# Patient Record
Sex: FEMALE | Race: WHITE | NOT HISPANIC OR LATINO | Employment: OTHER | URBAN - NONMETROPOLITAN AREA
[De-identification: names, ages, dates, MRNs, and addresses within clinical notes are randomized per-mention and may not be internally consistent; named-entity substitution may affect disease eponyms.]

---

## 2018-01-08 ENCOUNTER — OFFICE VISIT CONVERTED (OUTPATIENT)
Dept: FAMILY MEDICINE CLINIC | Age: 83
End: 2018-01-08
Attending: NURSE PRACTITIONER

## 2018-02-27 ENCOUNTER — CONVERSION ENCOUNTER (OUTPATIENT)
Dept: MAMMOGRAPHY | Facility: HOSPITAL | Age: 83
End: 2018-02-27

## 2018-05-22 ENCOUNTER — OFFICE VISIT CONVERTED (OUTPATIENT)
Dept: FAMILY MEDICINE CLINIC | Age: 83
End: 2018-05-22
Attending: NURSE PRACTITIONER

## 2018-10-18 ENCOUNTER — OFFICE VISIT CONVERTED (OUTPATIENT)
Dept: FAMILY MEDICINE CLINIC | Age: 83
End: 2018-10-18
Attending: NURSE PRACTITIONER

## 2018-11-05 ENCOUNTER — OFFICE VISIT CONVERTED (OUTPATIENT)
Dept: FAMILY MEDICINE CLINIC | Age: 83
End: 2018-11-05
Attending: NURSE PRACTITIONER

## 2018-12-19 ENCOUNTER — OFFICE VISIT CONVERTED (OUTPATIENT)
Dept: FAMILY MEDICINE CLINIC | Age: 83
End: 2018-12-19
Attending: NURSE PRACTITIONER

## 2019-01-03 ENCOUNTER — HOSPITAL ENCOUNTER (OUTPATIENT)
Dept: OTHER | Facility: HOSPITAL | Age: 84
Discharge: HOME | End: 2019-01-03
Attending: NURSE PRACTITIONER

## 2019-01-03 LAB
APPEARANCE UR: CLEAR
BACTERIA UR CULT: NORMAL
BACTERIA UR QL AUTO: ABNORMAL
BILIRUB UR QL: NEGATIVE
CASTS URNS QL MICRO: ABNORMAL /[LPF]
COLOR UR: ABNORMAL
CONV LEUKOCYTE ESTERASE: ABNORMAL
CONV UROBILINOGEN IN URINE BY AUTOMATED TEST STRIP: 0.2 {EHRLICHU}/DL (ref 0.1–1)
EPI CELLS #/AREA URNS HPF: ABNORMAL /[HPF]
GLUCOSE 24H UR-MCNC: NEGATIVE MG/DL
HGB UR QL STRIP: ABNORMAL
KETONES UR QL STRIP: NEGATIVE MG/DL
MUCOUS THREADS URNS QL MICRO: ABNORMAL
NITRITE UR-MCNC: NEGATIVE MG/ML
PH UR STRIP.AUTO: 6.5 [PH] (ref 5–8)
PROT UR-MCNC: NEGATIVE MG/DL
RBC # BLD AUTO: ABNORMAL /[HPF]
SP GR UR STRIP: 1.01 (ref 1–1.03)
SPECIMEN SOURCE: ABNORMAL
UNIDENT CRYS URNS QL MICRO: ABNORMAL /[HPF]
WBC #/AREA URNS HPF: ABNORMAL /[HPF]

## 2019-01-05 LAB
AMOXICILLIN+CLAV SUSC ISLT: 4
AMPICILLIN SUSC ISLT: >=32
AMPICILLIN+SULBAC SUSC ISLT: 16
BACTERIA UR CULT: ABNORMAL
CEFAZOLIN SUSC ISLT: <=4
CEFEPIME SUSC ISLT: <=1
CEFTAZIDIME SUSC ISLT: <=1
CEFTRIAXONE SUSC ISLT: <=1
CEFUROXIME ORAL SUSC ISLT: 4
CEFUROXIME PARENTER SUSC ISLT: 4
CIPROFLOXACIN SUSC ISLT: <=0.25
ERTAPENEM SUSC ISLT: <=0.5
GENTAMICIN SUSC ISLT: <=1
LEVOFLOXACIN SUSC ISLT: <=0.12
NITROFURANTOIN SUSC ISLT: <=16
TETRACYCLINE SUSC ISLT: <=1
TMP SMX SUSC ISLT: <=20
TOBRAMYCIN SUSC ISLT: <=1

## 2019-01-14 ENCOUNTER — OFFICE VISIT CONVERTED (OUTPATIENT)
Dept: FAMILY MEDICINE CLINIC | Age: 84
End: 2019-01-14
Attending: NURSE PRACTITIONER

## 2019-01-14 ENCOUNTER — HOSPITAL ENCOUNTER (OUTPATIENT)
Dept: OTHER | Facility: HOSPITAL | Age: 84
Discharge: HOME | End: 2019-01-14
Attending: NURSE PRACTITIONER

## 2019-01-14 LAB
ALBUMIN SERPL-MCNC: 4.1 G/DL (ref 3.5–5)
ALBUMIN/GLOB SERPL: 1.4 {RATIO} (ref 1.4–2.6)
ALP SERPL-CCNC: 70 U/L (ref 43–160)
ALT SERPL-CCNC: 12 U/L (ref 10–40)
ANION GAP SERPL CALC-SCNC: 20 MMOL/L (ref 8–19)
APPEARANCE UR: CLEAR
AST SERPL-CCNC: 20 U/L (ref 15–50)
BACTERIA UR QL AUTO: ABNORMAL
BASOPHILS # BLD MANUAL: 0.04 10*3/UL (ref 0–0.2)
BASOPHILS NFR BLD MANUAL: 0.5 % (ref 0–3)
BILIRUB SERPL-MCNC: 0.5 MG/DL (ref 0.2–1.3)
BILIRUB UR QL: ABNORMAL
BUN SERPL-MCNC: 20 MG/DL (ref 5–25)
BUN/CREAT SERPL: 20 {RATIO} (ref 6–20)
CALCIUM SERPL-MCNC: 9.1 MG/DL (ref 8.7–10.4)
CASTS URNS QL MICRO: ABNORMAL /[LPF]
CHLORIDE SERPL-SCNC: 102 MMOL/L (ref 99–111)
COLOR UR: YELLOW
CONV CO2: 22 MMOL/L (ref 22–32)
CONV LEUKOCYTE ESTERASE: NEGATIVE
CONV TOTAL PROTEIN: 7.1 G/DL (ref 6.3–8.2)
CONV UROBILINOGEN IN URINE BY AUTOMATED TEST STRIP: 1 {EHRLICHU}/DL (ref 0.1–1)
CREAT UR-MCNC: 1.01 MG/DL (ref 0.5–0.9)
DEPRECATED RDW RBC AUTO: 42.1 FL
EOSINOPHIL # BLD MANUAL: 0.71 10*3/UL (ref 0–0.7)
EOSINOPHIL NFR BLD MANUAL: 8.1 % (ref 0–7)
EPI CELLS #/AREA URNS HPF: ABNORMAL /[HPF]
ERYTHROCYTE [DISTWIDTH] IN BLOOD BY AUTOMATED COUNT: 12.7 % (ref 11.5–14.5)
GFR SERPLBLD BASED ON 1.73 SQ M-ARVRAT: 52 ML/MIN/{1.73_M2}
GLOBULIN UR ELPH-MCNC: 3 G/DL (ref 2–3.5)
GLUCOSE 24H UR-MCNC: NEGATIVE MG/DL
GLUCOSE SERPL-MCNC: 100 MG/DL (ref 65–99)
GRANS (ABSOLUTE): 6.11 10*3/UL (ref 2–8)
GRANS: 69.8 % (ref 30–85)
HBA1C MFR BLD: 14.4 G/DL (ref 12–16)
HCT VFR BLD AUTO: 42.5 % (ref 37–47)
HGB UR QL STRIP: ABNORMAL
IMM GRANULOCYTES # BLD: 0.02 10*3/UL (ref 0–0.54)
IMM GRANULOCYTES NFR BLD: 0.2 % (ref 0–0.43)
KETONES UR QL STRIP: 15 MG/DL
LYMPHOCYTES # BLD MANUAL: 1.03 10*3/UL (ref 1–5)
LYMPHOCYTES NFR BLD MANUAL: 9.6 % (ref 3–10)
MCH RBC QN AUTO: 30.5 PG (ref 27–31)
MCHC RBC AUTO-ENTMCNC: 33.9 G/DL (ref 33–37)
MCV RBC AUTO: 90 FL (ref 81–99)
MONOCYTES # BLD AUTO: 0.84 10*3/UL (ref 0.2–1.2)
MUCOUS THREADS URNS QL MICRO: ABNORMAL
NITRITE UR-MCNC: NEGATIVE MG/ML
OSMOLALITY SERPL CALC.SUM OF ELEC: 293 MOSM/KG (ref 273–304)
PH UR STRIP.AUTO: 5.5 [PH] (ref 5–8)
PLATELET # BLD AUTO: 169 10*3/UL (ref 130–400)
PMV BLD AUTO: 10.2 FL (ref 7.4–10.4)
POTASSIUM SERPL-SCNC: 3.6 MMOL/L (ref 3.5–5.3)
PROT UR-MCNC: ABNORMAL MG/DL
RBC # BLD AUTO: 4.72 10*6/UL (ref 4.2–5.4)
RBC # BLD AUTO: ABNORMAL /[HPF]
SODIUM SERPL-SCNC: 140 MMOL/L (ref 135–147)
SP GR UR STRIP: 1.02 (ref 1–1.03)
SPECIMEN SOURCE: ABNORMAL
UNIDENT CRYS URNS QL MICRO: ABNORMAL /[HPF]
VARIANT LYMPHS NFR BLD MANUAL: 11.8 % (ref 20–45)
WBC # BLD AUTO: 8.75 10*3/UL (ref 4.8–10.8)
WBC #/AREA URNS HPF: ABNORMAL /[HPF]

## 2019-01-17 LAB — BACTERIA UR CULT: NORMAL

## 2019-03-06 ENCOUNTER — HOSPITAL ENCOUNTER (OUTPATIENT)
Dept: OTHER | Facility: HOSPITAL | Age: 84
Discharge: HOME | End: 2019-03-06
Attending: NURSE PRACTITIONER

## 2019-03-06 LAB
CHOLEST SERPL-MCNC: 132 MG/DL (ref 107–200)
CHOLEST/HDLC SERPL: 2.7 {RATIO} (ref 3–6)
HDLC SERPL-MCNC: 49 MG/DL (ref 40–60)
LDLC SERPL CALC-MCNC: 69 MG/DL (ref 70–100)
TRIGL SERPL-MCNC: 72 MG/DL (ref 40–150)
VLDLC SERPL-MCNC: 14 MG/DL (ref 5–37)

## 2019-05-21 ENCOUNTER — HOSPITAL ENCOUNTER (OUTPATIENT)
Dept: OTHER | Facility: HOSPITAL | Age: 84
Discharge: HOME | End: 2019-05-21
Attending: NURSE PRACTITIONER

## 2019-05-21 ENCOUNTER — OFFICE VISIT CONVERTED (OUTPATIENT)
Dept: FAMILY MEDICINE CLINIC | Age: 84
End: 2019-05-21
Attending: NURSE PRACTITIONER

## 2019-05-21 LAB
ALBUMIN SERPL-MCNC: 4.3 G/DL (ref 3.5–5)
ALBUMIN/GLOB SERPL: 1.4 {RATIO} (ref 1.4–2.6)
ALP SERPL-CCNC: 59 U/L (ref 43–160)
ALT SERPL-CCNC: 14 U/L (ref 10–40)
ANION GAP SERPL CALC-SCNC: 14 MMOL/L (ref 8–19)
APPEARANCE UR: CLEAR
AST SERPL-CCNC: 26 U/L (ref 15–50)
BACTERIA UR CULT: NORMAL
BACTERIA UR QL AUTO: ABNORMAL
BILIRUB SERPL-MCNC: 0.53 MG/DL (ref 0.2–1.3)
BILIRUB UR QL: NEGATIVE
BUN SERPL-MCNC: 21 MG/DL (ref 5–25)
BUN/CREAT SERPL: 24 {RATIO} (ref 6–20)
CALCIUM SERPL-MCNC: 9.5 MG/DL (ref 8.7–10.4)
CASTS URNS QL MICRO: ABNORMAL /[LPF]
CHLORIDE SERPL-SCNC: 101 MMOL/L (ref 99–111)
COLOR UR: YELLOW
CONV CO2: 26 MMOL/L (ref 22–32)
CONV LEUKOCYTE ESTERASE: ABNORMAL
CONV TOTAL PROTEIN: 7.3 G/DL (ref 6.3–8.2)
CONV UROBILINOGEN IN URINE BY AUTOMATED TEST STRIP: 0.2 {EHRLICHU}/DL (ref 0.1–1)
CREAT UR-MCNC: 0.88 MG/DL (ref 0.5–0.9)
EPI CELLS #/AREA URNS HPF: ABNORMAL /[HPF]
GFR SERPLBLD BASED ON 1.73 SQ M-ARVRAT: >60 ML/MIN/{1.73_M2}
GLOBULIN UR ELPH-MCNC: 3 G/DL (ref 2–3.5)
GLUCOSE 24H UR-MCNC: NEGATIVE MG/DL
GLUCOSE SERPL-MCNC: 99 MG/DL (ref 65–99)
HGB UR QL STRIP: NEGATIVE
KETONES UR QL STRIP: NEGATIVE MG/DL
MUCOUS THREADS URNS QL MICRO: ABNORMAL
NITRITE UR-MCNC: NEGATIVE MG/ML
OSMOLALITY SERPL CALC.SUM OF ELEC: 287 MOSM/KG (ref 273–304)
PH UR STRIP.AUTO: 6 [PH] (ref 5–8)
POTASSIUM SERPL-SCNC: 4.2 MMOL/L (ref 3.5–5.3)
PROT UR-MCNC: NEGATIVE MG/DL
RBC # BLD AUTO: ABNORMAL /[HPF]
SODIUM SERPL-SCNC: 137 MMOL/L (ref 135–147)
SP GR UR STRIP: 1.01 (ref 1–1.03)
SPECIMEN SOURCE: ABNORMAL
UNIDENT CRYS URNS QL MICRO: ABNORMAL /[HPF]
WBC #/AREA URNS HPF: ABNORMAL /[HPF]

## 2019-05-24 LAB
AMOXICILLIN+CLAV SUSC ISLT: 8
AMPICILLIN SUSC ISLT: >=32
AMPICILLIN+SULBAC SUSC ISLT: >=32
BACTERIA UR CULT: ABNORMAL
CEFAZOLIN SUSC ISLT: <=4
CEFEPIME SUSC ISLT: <=1
CEFTAZIDIME SUSC ISLT: <=1
CEFTRIAXONE SUSC ISLT: <=1
CEFUROXIME ORAL SUSC ISLT: 4
CEFUROXIME PARENTER SUSC ISLT: 4
CIPROFLOXACIN SUSC ISLT: <=0.25
ERTAPENEM SUSC ISLT: <=0.5
GENTAMICIN SUSC ISLT: <=1
LEVOFLOXACIN SUSC ISLT: <=0.12
NITROFURANTOIN SUSC ISLT: <=16
TETRACYCLINE SUSC ISLT: <=1
TMP SMX SUSC ISLT: <=20
TOBRAMYCIN SUSC ISLT: <=1

## 2019-05-28 ENCOUNTER — HOSPITAL ENCOUNTER (OUTPATIENT)
Dept: OTHER | Facility: HOSPITAL | Age: 84
Discharge: HOME | End: 2019-05-28
Attending: NURSE PRACTITIONER

## 2019-05-28 ENCOUNTER — OFFICE VISIT CONVERTED (OUTPATIENT)
Dept: FAMILY MEDICINE CLINIC | Age: 84
End: 2019-05-28
Attending: NURSE PRACTITIONER

## 2019-05-28 LAB
ANION GAP SERPL CALC-SCNC: 20 MMOL/L (ref 8–19)
APPEARANCE UR: CLEAR
BACTERIA UR QL AUTO: ABNORMAL
BASOPHILS # BLD AUTO: 0.03 10*3/UL (ref 0–0.2)
BASOPHILS NFR BLD AUTO: 0.4 % (ref 0–3)
BILIRUB UR QL: NEGATIVE
BUN SERPL-MCNC: 30 MG/DL (ref 5–25)
BUN/CREAT SERPL: 29 {RATIO} (ref 6–20)
CALCIUM SERPL-MCNC: 9.2 MG/DL (ref 8.7–10.4)
CASTS URNS QL MICRO: ABNORMAL /[LPF]
CHLORIDE SERPL-SCNC: 101 MMOL/L (ref 99–111)
COLOR UR: YELLOW
CONV ABS IMM GRAN: 0.01 10*3/UL (ref 0–0.2)
CONV CO2: 23 MMOL/L (ref 22–32)
CONV IMMATURE GRAN: 0.1 % (ref 0–1.8)
CONV LEUKOCYTE ESTERASE: ABNORMAL
CONV UROBILINOGEN IN URINE BY AUTOMATED TEST STRIP: 1 {EHRLICHU}/DL (ref 0.1–1)
CREAT UR-MCNC: 1.03 MG/DL (ref 0.5–0.9)
DEPRECATED RDW RBC AUTO: 42.3 FL (ref 36.4–46.3)
EOSINOPHIL # BLD AUTO: 0.59 10*3/UL (ref 0–0.7)
EOSINOPHIL # BLD AUTO: 8.7 % (ref 0–7)
EPI CELLS #/AREA URNS HPF: ABNORMAL /[HPF]
ERYTHROCYTE [DISTWIDTH] IN BLOOD BY AUTOMATED COUNT: 12.8 % (ref 11.7–14.4)
GFR SERPLBLD BASED ON 1.73 SQ M-ARVRAT: 51 ML/MIN/{1.73_M2}
GLUCOSE 24H UR-MCNC: NEGATIVE MG/DL
GLUCOSE SERPL-MCNC: 103 MG/DL (ref 65–99)
HBA1C MFR BLD: 14.1 G/DL (ref 12–16)
HCT VFR BLD AUTO: 42.3 % (ref 37–47)
HGB UR QL STRIP: ABNORMAL
KETONES UR QL STRIP: ABNORMAL MG/DL
LYMPHOCYTES # BLD AUTO: 0.95 10*3/UL (ref 1–5)
MCH RBC QN AUTO: 30.5 PG (ref 27–31)
MCHC RBC AUTO-ENTMCNC: 33.3 G/DL (ref 33–37)
MCV RBC AUTO: 91.6 FL (ref 81–99)
MONOCYTES # BLD AUTO: 0.65 10*3/UL (ref 0.2–1.2)
MONOCYTES NFR BLD AUTO: 9.5 % (ref 3–10)
MUCOUS THREADS URNS QL MICRO: ABNORMAL
NEUTROPHILS # BLD AUTO: 4.59 10*3/UL (ref 2–8)
NEUTROPHILS NFR BLD AUTO: 67.4 % (ref 30–85)
NITRITE UR-MCNC: NEGATIVE MG/ML
NRBC CBCN: 0 % (ref 0–0.7)
OSMOLALITY SERPL CALC.SUM OF ELEC: 296 MOSM/KG (ref 273–304)
PH UR STRIP.AUTO: 5.5 [PH] (ref 5–8)
PLATELET # BLD AUTO: 165 10*3/UL (ref 130–400)
PMV BLD AUTO: 10.5 FL (ref 9.4–12.3)
POTASSIUM SERPL-SCNC: 3.5 MMOL/L (ref 3.5–5.3)
PROT UR-MCNC: NEGATIVE MG/DL
RBC # BLD AUTO: 4.62 10*6/UL (ref 4.2–5.4)
RBC # BLD AUTO: ABNORMAL /[HPF]
SODIUM SERPL-SCNC: 140 MMOL/L (ref 135–147)
SP GR UR STRIP: 1.01 (ref 1–1.03)
SPECIMEN SOURCE: ABNORMAL
UNIDENT CRYS URNS QL MICRO: ABNORMAL /[HPF]
VARIANT LYMPHS NFR BLD MANUAL: 13.9 % (ref 20–45)
WBC # BLD AUTO: 6.82 10*3/UL (ref 4.8–10.8)
WBC #/AREA URNS HPF: ABNORMAL /[HPF]

## 2019-06-01 LAB — BACTERIA UR CULT: NORMAL

## 2019-06-12 ENCOUNTER — HOSPITAL ENCOUNTER (OUTPATIENT)
Dept: OTHER | Facility: HOSPITAL | Age: 84
Discharge: HOME | End: 2019-06-12
Attending: NURSE PRACTITIONER

## 2019-06-12 LAB
ANION GAP SERPL CALC-SCNC: 17 MMOL/L (ref 8–19)
BUN SERPL-MCNC: 21 MG/DL (ref 5–25)
BUN/CREAT SERPL: 21 {RATIO} (ref 6–20)
CALCIUM SERPL-MCNC: 9.2 MG/DL (ref 8.7–10.4)
CHLORIDE SERPL-SCNC: 103 MMOL/L (ref 99–111)
CONV CO2: 25 MMOL/L (ref 22–32)
CREAT UR-MCNC: 1 MG/DL (ref 0.5–0.9)
GFR SERPLBLD BASED ON 1.73 SQ M-ARVRAT: 53 ML/MIN/{1.73_M2}
GLUCOSE SERPL-MCNC: 123 MG/DL (ref 65–99)
OSMOLALITY SERPL CALC.SUM OF ELEC: 294 MOSM/KG (ref 273–304)
POTASSIUM SERPL-SCNC: 4.6 MMOL/L (ref 3.5–5.3)
SODIUM SERPL-SCNC: 140 MMOL/L (ref 135–147)

## 2019-07-17 ENCOUNTER — HOSPITAL ENCOUNTER (OUTPATIENT)
Dept: OTHER | Facility: HOSPITAL | Age: 84
Discharge: HOME | End: 2019-07-17
Attending: NURSE PRACTITIONER

## 2019-07-17 LAB
ANION GAP SERPL CALC-SCNC: 16 MMOL/L (ref 8–19)
BUN SERPL-MCNC: 21 MG/DL (ref 5–25)
BUN/CREAT SERPL: 22 {RATIO} (ref 6–20)
CALCIUM SERPL-MCNC: 9.2 MG/DL (ref 8.7–10.4)
CHLORIDE SERPL-SCNC: 101 MMOL/L (ref 99–111)
CONV CO2: 26 MMOL/L (ref 22–32)
CREAT UR-MCNC: 0.97 MG/DL (ref 0.5–0.9)
GFR SERPLBLD BASED ON 1.73 SQ M-ARVRAT: 54 ML/MIN/{1.73_M2}
GLUCOSE SERPL-MCNC: 107 MG/DL (ref 65–99)
OSMOLALITY SERPL CALC.SUM OF ELEC: 291 MOSM/KG (ref 273–304)
POTASSIUM SERPL-SCNC: 4.4 MMOL/L (ref 3.5–5.3)
SODIUM SERPL-SCNC: 139 MMOL/L (ref 135–147)

## 2019-11-25 ENCOUNTER — OFFICE VISIT CONVERTED (OUTPATIENT)
Dept: FAMILY MEDICINE CLINIC | Age: 84
End: 2019-11-25
Attending: NURSE PRACTITIONER

## 2019-11-25 ENCOUNTER — HOSPITAL ENCOUNTER (OUTPATIENT)
Dept: OTHER | Facility: HOSPITAL | Age: 84
Discharge: HOME | End: 2019-11-25
Attending: NURSE PRACTITIONER

## 2019-11-25 LAB
ALBUMIN SERPL-MCNC: 4.4 G/DL (ref 3.5–5)
ALBUMIN/GLOB SERPL: 1.7 {RATIO} (ref 1.4–2.6)
ALP SERPL-CCNC: 54 U/L (ref 43–160)
ALT SERPL-CCNC: 18 U/L (ref 10–40)
ANION GAP SERPL CALC-SCNC: 19 MMOL/L (ref 8–19)
AST SERPL-CCNC: 30 U/L (ref 15–50)
BILIRUB SERPL-MCNC: 0.25 MG/DL (ref 0.2–1.3)
BUN SERPL-MCNC: 29 MG/DL (ref 5–25)
BUN/CREAT SERPL: 35 {RATIO} (ref 6–20)
CALCIUM SERPL-MCNC: 9.6 MG/DL (ref 8.7–10.4)
CHLORIDE SERPL-SCNC: 102 MMOL/L (ref 99–111)
CONV CO2: 24 MMOL/L (ref 22–32)
CONV TOTAL PROTEIN: 7 G/DL (ref 6.3–8.2)
CREAT UR-MCNC: 0.84 MG/DL (ref 0.5–0.9)
GFR SERPLBLD BASED ON 1.73 SQ M-ARVRAT: >60 ML/MIN/{1.73_M2}
GLOBULIN UR ELPH-MCNC: 2.6 G/DL (ref 2–3.5)
GLUCOSE SERPL-MCNC: 106 MG/DL (ref 65–99)
OSMOLALITY SERPL CALC.SUM OF ELEC: 298 MOSM/KG (ref 273–304)
POTASSIUM SERPL-SCNC: 4.4 MMOL/L (ref 3.5–5.3)
SODIUM SERPL-SCNC: 141 MMOL/L (ref 135–147)
TSH SERPL-ACNC: 1.16 M[IU]/L (ref 0.27–4.2)

## 2019-11-26 LAB
BASOPHILS # BLD MANUAL: 0.05 10*3/UL (ref 0–0.2)
BASOPHILS NFR BLD MANUAL: 0.7 % (ref 0–3)
DEPRECATED RDW RBC AUTO: 40.9 FL
EOSINOPHIL # BLD MANUAL: 0.18 10*3/UL (ref 0–0.7)
EOSINOPHIL NFR BLD MANUAL: 2.4 % (ref 0–7)
ERYTHROCYTE [DISTWIDTH] IN BLOOD BY AUTOMATED COUNT: 12.3 % (ref 11.5–14.5)
GRANS (ABSOLUTE): 5.02 10*3/UL (ref 2–8)
GRANS: 68.3 % (ref 30–85)
HBA1C MFR BLD: 14.4 G/DL (ref 12–16)
HCT VFR BLD AUTO: 42.8 % (ref 37–47)
IMM GRANULOCYTES # BLD: 0.01 10*3/UL (ref 0–0.54)
IMM GRANULOCYTES NFR BLD: 0.1 % (ref 0–0.43)
LYMPHOCYTES # BLD MANUAL: 1.51 10*3/UL (ref 1–5)
LYMPHOCYTES NFR BLD MANUAL: 8 % (ref 3–10)
MCH RBC QN AUTO: 30.3 PG (ref 27–31)
MCHC RBC AUTO-ENTMCNC: 33.6 G/DL (ref 33–37)
MCV RBC AUTO: 90.1 FL (ref 81–99)
MONOCYTES # BLD AUTO: 0.59 10*3/UL (ref 0.2–1.2)
PLATELET # BLD AUTO: 198 10*3/UL (ref 130–400)
PMV BLD AUTO: 10.7 FL (ref 7.4–10.4)
RBC # BLD AUTO: 4.75 10*6/UL (ref 4.2–5.4)
VARIANT LYMPHS NFR BLD MANUAL: 20.5 % (ref 20–45)
WBC # BLD AUTO: 7.36 10*3/UL (ref 4.8–10.8)

## 2019-12-19 ENCOUNTER — HOSPITAL ENCOUNTER (OUTPATIENT)
Dept: OTHER | Facility: HOSPITAL | Age: 84
Discharge: HOME | End: 2019-12-19
Attending: NURSE PRACTITIONER

## 2020-02-12 ENCOUNTER — OFFICE VISIT CONVERTED (OUTPATIENT)
Dept: FAMILY MEDICINE CLINIC | Age: 85
End: 2020-02-12
Attending: NURSE PRACTITIONER

## 2020-02-13 ENCOUNTER — HOSPITAL ENCOUNTER (OUTPATIENT)
Dept: OTHER | Facility: HOSPITAL | Age: 85
Discharge: HOME | End: 2020-02-13
Attending: NURSE PRACTITIONER

## 2020-02-13 LAB
25(OH)D3 SERPL-MCNC: 59.6 NG/ML (ref 30–100)
ALBUMIN SERPL-MCNC: 4.2 G/DL (ref 3.5–5)
ALBUMIN/GLOB SERPL: 1.7 {RATIO} (ref 1.4–2.6)
ALP SERPL-CCNC: 52 U/L (ref 43–160)
ALT SERPL-CCNC: 17 U/L (ref 10–40)
ANION GAP SERPL CALC-SCNC: 16 MMOL/L (ref 8–19)
AST SERPL-CCNC: 26 U/L (ref 15–50)
BILIRUB SERPL-MCNC: 0.47 MG/DL (ref 0.2–1.3)
BUN SERPL-MCNC: 21 MG/DL (ref 5–25)
BUN/CREAT SERPL: 24 {RATIO} (ref 6–20)
CALCIUM SERPL-MCNC: 9.4 MG/DL (ref 8.7–10.4)
CHLORIDE SERPL-SCNC: 102 MMOL/L (ref 99–111)
CHOLEST SERPL-MCNC: 127 MG/DL (ref 107–200)
CHOLEST/HDLC SERPL: 2.7 {RATIO} (ref 3–6)
CONV CO2: 25 MMOL/L (ref 22–32)
CONV TOTAL PROTEIN: 6.7 G/DL (ref 6.3–8.2)
CREAT UR-MCNC: 0.88 MG/DL (ref 0.5–0.9)
GFR SERPLBLD BASED ON 1.73 SQ M-ARVRAT: >60 ML/MIN/{1.73_M2}
GLOBULIN UR ELPH-MCNC: 2.5 G/DL (ref 2–3.5)
GLUCOSE SERPL-MCNC: 103 MG/DL (ref 65–99)
HDLC SERPL-MCNC: 47 MG/DL (ref 40–60)
LDLC SERPL CALC-MCNC: 63 MG/DL (ref 70–100)
OSMOLALITY SERPL CALC.SUM OF ELEC: 291 MOSM/KG (ref 273–304)
POTASSIUM SERPL-SCNC: 4.2 MMOL/L (ref 3.5–5.3)
SODIUM SERPL-SCNC: 139 MMOL/L (ref 135–147)
TRIGL SERPL-MCNC: 84 MG/DL (ref 40–150)
VLDLC SERPL-MCNC: 17 MG/DL (ref 5–37)

## 2020-03-04 ENCOUNTER — HOSPITAL ENCOUNTER (OUTPATIENT)
Dept: CARDIOLOGY | Facility: HOSPITAL | Age: 85
Discharge: HOME | End: 2020-03-04

## 2020-08-12 ENCOUNTER — OFFICE VISIT CONVERTED (OUTPATIENT)
Dept: FAMILY MEDICINE CLINIC | Age: 85
End: 2020-08-12
Attending: NURSE PRACTITIONER

## 2020-08-13 ENCOUNTER — HOSPITAL ENCOUNTER (OUTPATIENT)
Dept: OTHER | Facility: HOSPITAL | Age: 85
Discharge: HOME | End: 2020-08-13
Attending: NURSE PRACTITIONER

## 2020-08-13 LAB
ALBUMIN SERPL-MCNC: 4.4 G/DL (ref 3.5–5)
ALBUMIN/GLOB SERPL: 1.8 {RATIO} (ref 1.4–2.6)
ALP SERPL-CCNC: 59 U/L (ref 43–160)
ALT SERPL-CCNC: 13 U/L (ref 10–40)
ANION GAP SERPL CALC-SCNC: 23 MMOL/L (ref 8–19)
AST SERPL-CCNC: 25 U/L (ref 15–50)
BASOPHILS # BLD MANUAL: 0.05 10*3/UL (ref 0–0.2)
BASOPHILS NFR BLD MANUAL: 0.7 % (ref 0–3)
BILIRUB SERPL-MCNC: 0.59 MG/DL (ref 0.2–1.3)
BUN SERPL-MCNC: 20 MG/DL (ref 5–25)
BUN/CREAT SERPL: 19 {RATIO} (ref 6–20)
CALCIUM SERPL-MCNC: 10.2 MG/DL (ref 8.7–10.4)
CHLORIDE SERPL-SCNC: 105 MMOL/L (ref 99–111)
CHOLEST SERPL-MCNC: 144 MG/DL (ref 107–200)
CHOLEST/HDLC SERPL: 2.9 {RATIO} (ref 3–6)
CONV CO2: 21 MMOL/L (ref 22–32)
CONV TOTAL PROTEIN: 6.8 G/DL (ref 6.3–8.2)
CREAT UR-MCNC: 1.06 MG/DL (ref 0.5–0.9)
DEPRECATED RDW RBC AUTO: 41.8 FL
EOSINOPHIL # BLD MANUAL: 0.16 10*3/UL (ref 0–0.7)
EOSINOPHIL NFR BLD MANUAL: 2.4 % (ref 0–7)
ERYTHROCYTE [DISTWIDTH] IN BLOOD BY AUTOMATED COUNT: 12.6 % (ref 11.5–14.5)
GFR SERPLBLD BASED ON 1.73 SQ M-ARVRAT: 49 ML/MIN/{1.73_M2}
GLOBULIN UR ELPH-MCNC: 2.4 G/DL (ref 2–3.5)
GLUCOSE SERPL-MCNC: 111 MG/DL (ref 65–99)
GRANS (ABSOLUTE): 4.09 10*3/UL (ref 2–8)
GRANS: 60.5 % (ref 30–85)
HBA1C MFR BLD: 14.2 G/DL (ref 12–16)
HCT VFR BLD AUTO: 42.2 % (ref 37–47)
HDLC SERPL-MCNC: 49 MG/DL (ref 40–60)
IMM GRANULOCYTES # BLD: 0.01 10*3/UL (ref 0–0.54)
IMM GRANULOCYTES NFR BLD: 0.1 % (ref 0–0.43)
LDLC SERPL CALC-MCNC: 79 MG/DL (ref 70–100)
LYMPHOCYTES # BLD MANUAL: 1.81 10*3/UL (ref 1–5)
LYMPHOCYTES NFR BLD MANUAL: 9.6 % (ref 3–10)
MCH RBC QN AUTO: 30 PG (ref 27–31)
MCHC RBC AUTO-ENTMCNC: 33.6 G/DL (ref 33–37)
MCV RBC AUTO: 89 FL (ref 81–99)
MONOCYTES # BLD AUTO: 0.65 10*3/UL (ref 0.2–1.2)
OSMOLALITY SERPL CALC.SUM OF ELEC: 303 MOSM/KG (ref 273–304)
PLATELET # BLD AUTO: 168 10*3/UL (ref 130–400)
PMV BLD AUTO: 9.6 FL (ref 7.4–10.4)
POTASSIUM SERPL-SCNC: 4.4 MMOL/L (ref 3.5–5.3)
RBC # BLD AUTO: 4.74 10*6/UL (ref 4.2–5.4)
SODIUM SERPL-SCNC: 145 MMOL/L (ref 135–147)
TRIGL SERPL-MCNC: 80 MG/DL (ref 40–150)
VARIANT LYMPHS NFR BLD MANUAL: 26.7 % (ref 20–45)
VLDLC SERPL-MCNC: 16 MG/DL (ref 5–37)
WBC # BLD AUTO: 6.77 10*3/UL (ref 4.8–10.8)

## 2020-08-20 ENCOUNTER — OFFICE VISIT CONVERTED (OUTPATIENT)
Dept: FAMILY MEDICINE CLINIC | Age: 85
End: 2020-08-20
Attending: NURSE PRACTITIONER

## 2020-08-24 ENCOUNTER — HOSPITAL ENCOUNTER (OUTPATIENT)
Dept: OTHER | Facility: HOSPITAL | Age: 85
Discharge: HOME | End: 2020-08-24
Attending: NURSE PRACTITIONER

## 2020-08-26 ENCOUNTER — HOSPITAL ENCOUNTER (OUTPATIENT)
Dept: PET IMAGING | Facility: HOSPITAL | Age: 85
Discharge: HOME | End: 2020-08-26
Attending: NURSE PRACTITIONER

## 2020-09-03 ENCOUNTER — HOSPITAL ENCOUNTER (OUTPATIENT)
Dept: CARDIOLOGY | Facility: HOSPITAL | Age: 85
Discharge: HOME | End: 2020-09-03
Attending: INTERNAL MEDICINE

## 2020-09-15 ENCOUNTER — HOSPITAL ENCOUNTER (OUTPATIENT)
Dept: OTHER | Facility: HOSPITAL | Age: 85
Discharge: HOME | End: 2020-09-15
Attending: THORACIC SURGERY (CARDIOTHORACIC VASCULAR SURGERY)

## 2020-09-22 ENCOUNTER — OFFICE VISIT CONVERTED (OUTPATIENT)
Dept: ONCOLOGY | Facility: HOSPITAL | Age: 85
End: 2020-09-22
Attending: INTERNAL MEDICINE

## 2020-10-19 ENCOUNTER — HOSPITAL ENCOUNTER (OUTPATIENT)
Dept: GENERAL RADIOLOGY | Facility: HOSPITAL | Age: 85
Discharge: HOME | End: 2020-10-19
Attending: THORACIC SURGERY (CARDIOTHORACIC VASCULAR SURGERY)

## 2020-10-20 ENCOUNTER — HOSPITAL ENCOUNTER (OUTPATIENT)
Dept: OTHER | Facility: HOSPITAL | Age: 85
Discharge: HOME | End: 2020-10-20
Attending: THORACIC SURGERY (CARDIOTHORACIC VASCULAR SURGERY)

## 2020-10-20 ENCOUNTER — OFFICE VISIT CONVERTED (OUTPATIENT)
Dept: ONCOLOGY | Facility: HOSPITAL | Age: 85
End: 2020-10-20
Attending: THORACIC SURGERY (CARDIOTHORACIC VASCULAR SURGERY)

## 2020-10-20 ENCOUNTER — HOSPITAL ENCOUNTER (OUTPATIENT)
Dept: GENERAL RADIOLOGY | Facility: HOSPITAL | Age: 85
Discharge: HOME | End: 2020-10-20
Attending: THORACIC SURGERY (CARDIOTHORACIC VASCULAR SURGERY)

## 2020-11-03 ENCOUNTER — OFFICE VISIT CONVERTED (OUTPATIENT)
Dept: ONCOLOGY | Facility: HOSPITAL | Age: 85
End: 2020-11-03
Attending: THORACIC SURGERY (CARDIOTHORACIC VASCULAR SURGERY)

## 2020-11-03 ENCOUNTER — HOSPITAL ENCOUNTER (OUTPATIENT)
Dept: ONCOLOGY | Facility: HOSPITAL | Age: 85
Discharge: HOME | End: 2020-11-03
Attending: THORACIC SURGERY (CARDIOTHORACIC VASCULAR SURGERY)

## 2020-11-16 ENCOUNTER — HOSPITAL ENCOUNTER (OUTPATIENT)
Dept: OTHER | Facility: HOSPITAL | Age: 85
Discharge: HOME | End: 2020-11-16
Attending: NURSE PRACTITIONER

## 2020-11-16 ENCOUNTER — OFFICE VISIT CONVERTED (OUTPATIENT)
Dept: FAMILY MEDICINE CLINIC | Age: 85
End: 2020-11-16
Attending: NURSE PRACTITIONER

## 2020-11-16 LAB
ALBUMIN SERPL-MCNC: 4.2 G/DL (ref 3.5–5)
ALBUMIN/GLOB SERPL: 1.7 {RATIO} (ref 1.4–2.6)
ALP SERPL-CCNC: 68 U/L (ref 43–160)
ALT SERPL-CCNC: 15 U/L (ref 10–40)
ANION GAP SERPL CALC-SCNC: 18 MMOL/L (ref 8–19)
AST SERPL-CCNC: 23 U/L (ref 15–50)
BILIRUB SERPL-MCNC: 0.26 MG/DL (ref 0.2–1.3)
BUN SERPL-MCNC: 19 MG/DL (ref 5–25)
BUN/CREAT SERPL: 25 {RATIO} (ref 6–20)
CALCIUM SERPL-MCNC: 9.3 MG/DL (ref 8.7–10.4)
CHLORIDE SERPL-SCNC: 106 MMOL/L (ref 99–111)
CONV CO2: 24 MMOL/L (ref 22–32)
CONV TOTAL PROTEIN: 6.7 G/DL (ref 6.3–8.2)
CREAT UR-MCNC: 0.76 MG/DL (ref 0.5–0.9)
GFR SERPLBLD BASED ON 1.73 SQ M-ARVRAT: >60 ML/MIN/{1.73_M2}
GLOBULIN UR ELPH-MCNC: 2.5 G/DL (ref 2–3.5)
GLUCOSE SERPL-MCNC: 99 MG/DL (ref 65–99)
OSMOLALITY SERPL CALC.SUM OF ELEC: 300 MOSM/KG (ref 273–304)
POTASSIUM SERPL-SCNC: 4.1 MMOL/L (ref 3.5–5.3)
SODIUM SERPL-SCNC: 144 MMOL/L (ref 135–147)

## 2021-01-06 ENCOUNTER — HOSPITAL ENCOUNTER (OUTPATIENT)
Dept: OTHER | Facility: HOSPITAL | Age: 86
Discharge: HOME | End: 2021-01-06
Attending: NURSE PRACTITIONER

## 2021-01-06 ENCOUNTER — OFFICE VISIT CONVERTED (OUTPATIENT)
Dept: FAMILY MEDICINE CLINIC | Age: 86
End: 2021-01-06
Attending: NURSE PRACTITIONER

## 2021-01-06 LAB
ALBUMIN SERPL-MCNC: 4.2 G/DL (ref 3.5–5)
ALBUMIN/GLOB SERPL: 1.7 {RATIO} (ref 1.4–2.6)
ALP SERPL-CCNC: 62 U/L (ref 43–160)
ALT SERPL-CCNC: 13 U/L (ref 10–40)
ANION GAP SERPL CALC-SCNC: 13 MMOL/L (ref 8–19)
AST SERPL-CCNC: 24 U/L (ref 15–50)
BASOPHILS # BLD MANUAL: 0.03 10*3/UL (ref 0–0.2)
BASOPHILS NFR BLD MANUAL: 0.5 % (ref 0–3)
BILIRUB SERPL-MCNC: 0.41 MG/DL (ref 0.2–1.3)
BUN SERPL-MCNC: 18 MG/DL (ref 5–25)
BUN/CREAT SERPL: 21 {RATIO} (ref 6–20)
CALCIUM SERPL-MCNC: 9.3 MG/DL (ref 8.7–10.4)
CHLORIDE SERPL-SCNC: 104 MMOL/L (ref 99–111)
CHOLEST SERPL-MCNC: 153 MG/DL (ref 107–200)
CHOLEST/HDLC SERPL: 3.3 {RATIO} (ref 3–6)
CONV CO2: 28 MMOL/L (ref 22–32)
CONV TOTAL PROTEIN: 6.7 G/DL (ref 6.3–8.2)
CREAT UR-MCNC: 0.86 MG/DL (ref 0.5–0.9)
DEPRECATED RDW RBC AUTO: 44.1 FL
EOSINOPHIL # BLD MANUAL: 0.13 10*3/UL (ref 0–0.7)
EOSINOPHIL NFR BLD MANUAL: 2.2 % (ref 0–7)
ERYTHROCYTE [DISTWIDTH] IN BLOOD BY AUTOMATED COUNT: 13 % (ref 11.5–14.5)
GFR SERPLBLD BASED ON 1.73 SQ M-ARVRAT: >60 ML/MIN/{1.73_M2}
GLOBULIN UR ELPH-MCNC: 2.5 G/DL (ref 2–3.5)
GLUCOSE SERPL-MCNC: 87 MG/DL (ref 65–99)
GRANS (ABSOLUTE): 3.45 10*3/UL (ref 2–8)
GRANS: 57.8 % (ref 30–85)
HBA1C MFR BLD: 13.3 G/DL (ref 12–16)
HCT VFR BLD AUTO: 40 % (ref 37–47)
HDLC SERPL-MCNC: 47 MG/DL (ref 40–60)
IMM GRANULOCYTES # BLD: 0 10*3/UL (ref 0–0.54)
IMM GRANULOCYTES NFR BLD: 0 % (ref 0–0.43)
LDLC SERPL CALC-MCNC: 90 MG/DL (ref 70–100)
LYMPHOCYTES # BLD MANUAL: 1.79 10*3/UL (ref 1–5)
LYMPHOCYTES NFR BLD MANUAL: 9.5 % (ref 3–10)
MCH RBC QN AUTO: 30.4 PG (ref 27–31)
MCHC RBC AUTO-ENTMCNC: 33.3 G/DL (ref 33–37)
MCV RBC AUTO: 91.3 FL (ref 81–99)
MONOCYTES # BLD AUTO: 0.57 10*3/UL (ref 0.2–1.2)
OSMOLALITY SERPL CALC.SUM OF ELEC: 293 MOSM/KG (ref 273–304)
PLATELET # BLD AUTO: 166 10*3/UL (ref 130–400)
PMV BLD AUTO: 8.9 FL (ref 7.4–10.4)
POTASSIUM SERPL-SCNC: 3.8 MMOL/L (ref 3.5–5.3)
RBC # BLD AUTO: 4.38 10*6/UL (ref 4.2–5.4)
SODIUM SERPL-SCNC: 141 MMOL/L (ref 135–147)
TRIGL SERPL-MCNC: 81 MG/DL (ref 40–150)
TSH SERPL-ACNC: 1.28 M[IU]/L (ref 0.27–4.2)
VARIANT LYMPHS NFR BLD MANUAL: 30 % (ref 20–45)
VLDLC SERPL-MCNC: 16 MG/DL (ref 5–37)
WBC # BLD AUTO: 5.97 10*3/UL (ref 4.8–10.8)

## 2021-01-07 LAB
FOLATE SERPL-MCNC: >20 NG/ML (ref 4.8–20)
VIT B12 SERPL-MCNC: 630 PG/ML (ref 211–911)

## 2021-03-10 ENCOUNTER — HOSPITAL ENCOUNTER (OUTPATIENT)
Dept: VACCINE CLINIC | Facility: HOSPITAL | Age: 86
Discharge: HOME | End: 2021-03-10
Attending: INTERNAL MEDICINE

## 2021-05-18 NOTE — PROGRESS NOTES
Karly Laguerre 1935     Office/Outpatient Visit    Visit Date: Tue, May 22, 2018 10:14 am    Provider: Celsa Boswell N.P. (Assistant: Yadira Trujillo MA)    Location: [facility]        Electronically signed by Celsa Boswell N.P. on  2018 10:40:22 AM                             SUBJECTIVE:        CC:     Ms. Laguerre is a 82 year old White female.  This is a follow-up visit.  check up;         HPI:         Patient to be evaluated for essential hypertension.  Her current cardiac medication regimen includes a beta-blocker ( Tenormin ).  She is tolerating the medication well without side effects.  Compliance with treatment has been good; she takes her medication as directed.      ROS:     CONSTITUTIONAL:  Negative for chills, fatigue, fever, and weight change.      CARDIOVASCULAR:  Negative for chest pain, palpitations, tachycardia, orthopnea, and edema.      RESPIRATORY:  Negative for cough, dyspnea, and hemoptysis.      NEUROLOGICAL:  Positive for light headed at times.  had her sugar checked and she is  not sure what it was but between 50-70, has been trying to eat more         PMH/FMH/SH:     Last Reviewed on 2018 10:26 AM by Celsa Boswell    Past Medical History:             GYNECOLOGICAL HISTORY:             PREVENTIVE HEALTH MAINTENANCE             BONE DENSITY: was last done 14 with the following abnormality noted-- osteopenia     COLORECTAL CANCER SCREENING: colonoscopy with the following abnormalities noted-- severe diverticulosis; dr oneill     MAMMOGRAM: Done within last 2 years and results in are chart was last done 2018 stable         Surgical History:         Biopsy of breast; benign    Cholecystectomy    Dilation and Curettage    Hysterectomy: rectocele repair / BSO;     COLONOSCOPY: Done within the last 10 years was last done 3-15-17         Family History:     Father:  at age 32; Cause of death was MVA     Mother:  at age 61; Cause of death  was CLL     Brother(s): Coronary Artery Disease; Hypertension     Sister(s): Hypertension;  Colon Cancer;  COPD         Social History:     Occupation: Retired (Prior occupation: RN)     Marital Status:      Children: 2 children         Tobacco/Alcohol/Supplements:     Last Reviewed on 5/22/2018 10:18 AM by Yadira Trujillo    Tobacco: She has a past history of cigarette smoking; quit date:  1982.          Alcohol: Frequency: Daily (glass of wine)             Immunizations:     zzPrevnar-13 1/17/2017     Fluzone (3 + years dose) 9/21/2009     Pneomovax 11/0/2003     Fluzone High-Dose pf (>=65 yr) 10/16/2013     Fluzone High-Dose pf (>=65 yr) 10/7/2015     Fluzone High-Dose pf (>=65 yr) 9/30/2016     Zostavax (Zoster) 2/24/2010         Allergies:     Last Reviewed on 5/22/2018 10:18 AM by Yadira Trujillo    Penicillins:    ACE Inhibitors:    Bactrim:        Current Medications:     Last Reviewed on 5/22/2018 10:18 AM by Yadira Trujillo    Atenolol 25mg Tablet Take 1 tablet(s) by mouth BID     Multivitamin/Mineral Supplement Tablet one a day     Zyrtec 10mg Tablet 1 tab daily     Caltrate 600 + D     Aspirin (ASA) 81mg Chewable Tablet 1 a day         OBJECTIVE:        Vitals:         Current: 5/22/2018 10:16:31 AM    Ht:  5 ft, 7 in;  Wt: 121.7 lbs;  BMI: 19.1    T: 96.6 F (oral);  BP: 128/53 mm Hg (left arm, sitting);  P: 66 bpm (left arm (BP Cuff), sitting);  sCr: 0.8 mg/dL;  GFR: 53.23        Exams:     PHYSICAL EXAM:     GENERAL: vital signs recorded - well developed, well nourished;  no apparent distress;     EYES: PERRLA;     NECK: carotid exam reveals no bruits;     RESPIRATORY: normal respiratory rate and pattern with no distress; normal breath sounds with no rales, rhonchi, wheezes or rubs;     CARDIOVASCULAR: normal rate; rhythm is regular;  no systolic murmur; no edema;     NEUROLOGIC: GROSSLY INTACT     PSYCHIATRIC:  appropriate affect and demeanor; normal speech pattern; grossly  normal memory;         ASSESSMENT           401.1   I10  Essential hypertension              DDx:     780.4   R42  Light-headedness              DDx:         ORDERS:         Lab Orders:       [id number]  COMP - [facility] Comp. Metabolic Panel  (Send-Out)         [id number]  BDCB - [facility] CBC with 3 part diff  (Send-Out)         [id number]  TSH - [facility] TSH  (Send-Out)                   PLAN:          Essential hypertension had breakfast 2 hours ago /discussed hep A, she thinks she had this as a child/ there was an outbreak  in her school     LABORATORY:  Labs ordered to be performed today include Comprehensive metabolic panel.            Orders:       [id number]  COMP - [facility] Comp. Metabolic Panel  (Send-Out)             Patient Education Handouts:       Hepatitis A           Light-headedness (saw Dr Beltran earlier this year for intermittent claudication and started her on ASA and to walking 5 days a week, she tries and usually gets at least 4 days of exercise in per week)     LABORATORY:  Labs ordered to be performed today include CBC and TSH.            Orders:       [id number]  BDCB - [facility] CBC with 3 part diff  (Send-Out)         [id number]  TSH - [facility] TSH  (Send-Out)               CHARGE CAPTURE           **Please note: ICD descriptions below are intended for billing purposes only and may not represent clinical diagnoses**        Primary Diagnosis:         401.1 Essential hypertension            I10    Essential (primary) hypertension              Orders:          [id number]   Office/outpatient visit; established patient, level 3  (In-House)           780.4 Light-headedness            R42    Dizziness and giddiness

## 2021-05-18 NOTE — PROGRESS NOTES
Karly LaguerreJeffery 1935     Office/Outpatient Visit    Visit Date: Tue, May 28, 2019 02:50 pm    Provider: Celsa Boswell N.P. (Assistant: Stormy Mattson)    Location: [facility]        Electronically signed by Celsa Boswell N.P. on  2019 04:10:40 PM                             SUBJECTIVE:        CC:     Ms. Laguerre is a 83 year old White female.  presents today due to Nausea d/t antibiotics for UTI, low grade fever         HPI:         The symptom began >1 weeks ago.  Prior work-up has included + urine culture, senstive to macrobid.      ROS:     CONSTITUTIONAL:  Positive for fever.  low grade, was over 100 but now running around 99     GASTROINTESTINAL:  Positive for anorexia and nausea.      GENITOURINARY:  Negative for hematuria.      MUSCULOSKELETAL:  Negative for back pain.          [facility]/:     Last Reviewed on 2019 04:09 PM by Celsa Boswell    Past Medical History:                 PAST MEDICAL HISTORY         retinal detachment in 1965/ had surgery R         GYNECOLOGICAL HISTORY:             PREVENTIVE HEALTH MAINTENANCE             BONE DENSITY: was last done 14 with the following abnormality noted-- osteopenia     COLORECTAL CANCER SCREENING: colonoscopy with the following abnormalities noted-- severe diverticulosis; dr oneill     MAMMOGRAM: Done within last 2 years and results in are chart was last done 2018 stable         Surgical History:         Biopsy of breast; benign    Cataract Removal: bilateral; ;     Cholecystectomy    Dilation and Curettage    Hysterectomy: rectocele repair / BSO;     COLONOSCOPY:         Family History:     Father:  at age 32; Cause of death was MVA     Mother:  at age 61; Cause of death was CLL     Brother(s): 2 brother(s) total;  Coronary Artery Disease; Hypertension     Sister(s): 8 sister(s) total; 2 ;  Hypertension;  Colon Cancer;  COPD         Social History:     Occupation: Retired (Prior  occupation: RN)     Marital Status:      Children: 2 children         Tobacco/Alcohol/Supplements:     Last Reviewed on 5/28/2019 02:50 PM by Stormy Mattson    Tobacco: She has a past history of cigarette smoking; quit date:  1982.          Alcohol: Frequency: Daily (glass of wine)         Substance Abuse History:     Last Reviewed on 1/14/2019 11:26 AM by Christiane Boswell        Mental Health History:     Last Reviewed on 1/14/2019 11:26 AM by Christiane Boswell        Communicable Diseases (eg STDs):     Last Reviewed on 1/14/2019 11:26 AM by Christiane Boswell            Immunizations:     zzPrevnar-13 1/17/2017     Fluzone (3 + years dose) 9/21/2009     Pneomovax 11/0/2003     Fluzone High-Dose pf (>=65 yr) 10/16/2013     Fluzone High-Dose pf (>=65 yr) 10/7/2015     Fluzone High-Dose pf (>=65 yr) 9/30/2016     Fluzone High-Dose pf (>=65 yr) 10/1/2018     Influenza Virus Vaccine, unspecified formulation 10/1/2017     Zostavax (Zoster live) 2/24/2010         Allergies:     Last Reviewed on 5/28/2019 02:50 PM by Stormy Mattson    Penicillins:    ACE Inhibitors:    Bactrim:        Current Medications:     Last Reviewed on 5/28/2019 02:57 PM by Stormy Mattson    Lipitor 10mg Tablet 1 tab QOD     Multivitamin/Mineral Supplement Tablet one a day     Atenolol 25mg Tablet Take 1 tablet(s) by mouth BID     Macrobid 100mg Capsules one BID x 7 days     stazol 100 mg bid     Aspirin (ASA) 81mg Chewable Tablet 1 a day     Zyrtec 10mg Tablet 1 tab daily     Caltrate 600 + D         OBJECTIVE:        Vitals:         Current: 5/28/2019 2:56:17 PM    Ht:  5 ft, 7 in;  Wt: 120.8 lbs;  BMI: 18.9    T: 97.6 F (oral);  BP: 133/60 mm Hg (left arm, sitting);  P: 79 bpm (left arm (BP Cuff), sitting);  sCr: 0.88 mg/dL;  GFR: 47.47    O2 Sat: 100 % (room air)        Exams:     PHYSICAL EXAM:     GENERAL: vital signs recorded - well developed, well nourished;  no apparent distress;     RESPIRATORY: normal respiratory rate  and pattern with no distress; normal breath sounds with no rales, rhonchi, wheezes or rubs;     CARDIOVASCULAR: normal rate; rhythm is regular;  no systolic murmur;     MUSCULOSKELETAL: no CVA tenderness;     PSYCHIATRIC:  appropriate affect and demeanor; normal speech pattern; grossly normal memory;         ASSESSMENT           599.0   N39.0  UTI              DDx:         ORDERS:         Lab Orders:       [id number]  [facility] CBC with 3 part diff  (Send-Out)         [id number]  [facility] Basic Metabolic Panel  (Send-Out)         99283  BDUA - [facility] Urinalysis, automated, with micro  (Send-Out)                   PLAN:          UTI may add ATB pending labs, drink plenty of water, she is going to try cranberry pills, okay to stop macrobid     LABORATORY:  Labs ordered to be performed today include basic metabolic panel, CBC, and urinalysis with micro.            Orders:       [id number]  [facility] CBC with 3 part diff  (Send-Out)         [id number]  [facility] Basic Metabolic Panel  (Send-Out)         07897  [facility]UA - [facility] Urinalysis, automated, with micro  (Send-Out)               CHARGE CAPTURE           **Please note: ICD descriptions below are intended for billing purposes only and may not represent clinical diagnoses**        Primary Diagnosis:         599.0 UTI            N39.0    Urinary tract infection, site not specified              Orders:          [id number]   Office/outpatient visit; established patient, level 3  (In-House)

## 2021-05-18 NOTE — PROGRESS NOTES
Karly Laguerre 1935     Office/Outpatient Visit    Visit Date: Thu, Oct 18, 2018 11:26 am    Provider: Jes Rodriguez N.P. (Assistant: Leon Beltran)    Location: [facility]        Electronically signed by Jes Rodriguez N.P. on  10/18/2018 01:10:01 PM                             SUBJECTIVE:        CC:     Ms. Laguerre is a 83 year old White female.  fell yesterday afternoon, onto left side, can't bend, has been taking tylenol;         HPI:         Patient to be evaluated for sacral pain.  Other details: at [employer] yesterday.  had just come upstairs from basement.  fell against railing around stairway and landed on left posterior buttock.  is sore today but not bruised.  pain mostly with certian movement only.  has applied ice.  no other intervention..      ROS:     CONSTITUTIONAL:  Negative for chills, fatigue, fever, and weight change.      CARDIOVASCULAR:  Negative for chest pain, palpitations, tachycardia, orthopnea, and edema.      RESPIRATORY:  Negative for cough, dyspnea, and hemoptysis.      GASTROINTESTINAL:  Negative for abdominal pain, heartburn, constipation, diarrhea, and stool changes.      GENITOURINARY:  Negative for genital lesions, hematuria, menstrual problems, polyuria, abnormal vaginal bleeding, and vaginal discharge.      MUSCULOSKELETAL:  Positive for pain sacral/ ppsterior pelvis left sided.      NEUROLOGICAL:  Negative for dizziness, headaches, paresthesias, and weakness.          PMH/FMH/SH:     Last Reviewed on 2018 10:26 AM by Celsa Boswell    Past Medical History:                 PAST MEDICAL HISTORY         retinal detachment in 1965/ had surgery R         GYNECOLOGICAL HISTORY:             PREVENTIVE HEALTH MAINTENANCE             BONE DENSITY: was last done 14 with the following abnormality noted-- osteopenia     COLORECTAL CANCER SCREENING: colonoscopy with the following abnormalities noted-- severe diverticulosis; dr oneill     MAMMOGRAM: Done  within last 2 years and results in are chart was last done 2018 stable         Surgical History:         Biopsy of breast; benign    Cataract Removal: bilateral; 2016;     Cholecystectomy    Dilation and Curettage    Hysterectomy: rectocele repair / BSO;     COLONOSCOPY:         Family History:     Father:  at age 32; Cause of death was MVA     Mother:  at age 61; Cause of death was CLL     Brother(s): Coronary Artery Disease; Hypertension     Sister(s): Hypertension;  Colon Cancer;  COPD         Social History:     Occupation: Retired (Prior occupation: RN)     Marital Status:      Children: 2 children         Tobacco/Alcohol/Supplements:     Last Reviewed on 2018 10:18 AM by Yadira Trujillo    Tobacco: She has a past history of cigarette smoking; quit date:  .          Alcohol: Frequency: Daily (glass of wine)             Current Problems:     Last Reviewed on 2013 10:19 AM by Celsa Boswell    Light-headedness     Joint pain     Microscopic hematuria     Fever, unspecified     Heel pain     Dizziness     History of urinary stress incontinence     Varicose leg veins     Osteopenia     Essential hypertension     Fatigue     Atrophic vaginitis, postmenopausal     Screening for breast cancer         Immunizations:     zzPrevnar-13 2017     Fluzone (3 + years dose) 2009     Pneomovax      Fluzone High-Dose pf (>=65 yr) 10/16/2013     Fluzone High-Dose pf (>=65 yr) 10/7/2015     Fluzone High-Dose pf (>=65 yr) 2016     Fluzone High-Dose pf (>=65 yr) 10/1/2018     Influenza Virus Vaccine, unspecified formulation 10/1/2017     Zostavax (Zoster live) 2010         Allergies:     Last Reviewed on 2018 10:18 AM by Yadira Trujillo    Penicillins:    ACE Inhibitors:    Bactrim:        Current Medications:     Last Reviewed on 2018 10:18 AM by Yadira Trujillo    Atenolol 25mg Tablet Take 1 tablet(s) by mouth BID     Multivitamin/Mineral  Supplement Tablet one a day     Aspirin (ASA) 81mg Chewable Tablet 1 a day     Zyrtec 10mg Tablet 1 tab daily     Caltrate 600 + D         OBJECTIVE:        Vitals:         Historical:     05/22/2018  BP:   128/53 mm Hg ( (left arm, , sitting, );)     05/22/2018  Wt:   121.7lbs        Current: 10/18/2018 11:29:42 AM    Ht:  5 ft, 7 in;  Wt: 130.2 lbs;  BMI: 20.4    T: 97.7 F (oral);  BP: 133/54 mm Hg (right arm, sitting);  P: 66 bpm (right arm (BP Cuff), sitting);  sCr: 0.74 mg/dL;  GFR: 58.28        Exams:     PHYSICAL EXAM:     GENERAL:  well developed and nourished; appropriately groomed; in no apparent distress;     RESPIRATORY: normal respiratory rate and pattern with no distress; normal breath sounds with no rales, rhonchi, wheezes or rubs;     CARDIOVASCULAR: normal rate; rhythm is regular;     MUSCULOSKELETAL: gait: uses a cane;  pain with range of motion in: back extension;  tender to touch left posterior iliac crest     NEUROLOGIC: mental status: alert and oriented x 3; GROSSLY INTACT     PSYCHIATRIC:  appropriate affect and demeanor; normal speech pattern; grossly normal memory;         ASSESSMENT           724.6   M54.18  Sacral pain              DDx:         ORDERS:         Radiology/Test Orders:       [id number]  Radiologic examination, pelvis; 1 or 2 views  (Send-Out)         [id number]  X-ray sacrum and coccyx  (Send-Out)                   PLAN:          Sacral pain         apply cold.  tylenol prn has been controlling pain so far.  monitor closely for increasing pain.  xrays pending.  she declines xray of spine.            Orders:       [id number]  Radiologic examination, pelvis; 1 or 2 views  (Send-Out)         [id number]  X-ray sacrum and coccyx  (Send-Out)               CHARGE CAPTURE           **Please note: ICD descriptions below are intended for billing purposes only and may not represent clinical diagnoses**        Primary Diagnosis:         724.6 Sacral pain            M54.18    Radiculopathy, sacral and  sacrococcygeal region              Orders:          [id number]   Office/outpatient visit; established patient, level 3  (In-House)

## 2021-05-18 NOTE — PROGRESS NOTES
Karly Laguerre 1935     Office/Outpatient Visit    Visit Date:  02:25 pm    Provider: Celsa Boswell N.P. (Assistant: Yadira Trujillo MA)    Location: [facility]        Electronically signed by Celsa Boswell N.P. on  2018 03:24:28 PM                             SUBJECTIVE:        CC:     Ms. Laguerre is a 82 year old White female.  She presents with right leg pain.          HPI:     leg pain     The symptom began 6 weeks ago.  right leg pain started after walking on treadmill but pain with walking, relieves with sitting or standing, pain is located in her right lower lateral leg and upper leg (has tried aleve, that does relieve her symptoms)     ROS:     CONSTITUTIONAL:  Negative for chills, fatigue, fever, and weight change.      CARDIOVASCULAR:  Negative for chest pain, palpitations, tachycardia, orthopnea, and edema.      RESPIRATORY:  Negative for cough, dyspnea, and hemoptysis.      MUSCULOSKELETAL:  Negative for back pain.      NEUROLOGICAL:  Negative for dizziness, headaches, paresthesias, and weakness.          PMH/FM/SH:     Last Reviewed on 2018 03:14 PM by Celsa Boswell    Past Medical History:             GYNECOLOGICAL HISTORY:             PREVENTIVE HEALTH MAINTENANCE             COLONOSCOPY: Done within the last 10 years was last done 7/2/08 3-15-17 with the following abnormalities noted-- severe diverticulosis dr oneill     MAMMOGRAM: was last done 2016 with normal results [facility]     BONE DENSITY: was last done 14 with the following abnormality noted-- osteopenia     PNEUMOCOCCAL 23 VACCINE: was last done Yes, date unavailable     Prevnar 13: was last done 17     INFLUENZA VACCINE: was last done 16         Surgical History:         Biopsy of breast; benign    Cholecystectomy    Dilation and Curettage    Hysterectomy: rectocele repair / BSO;     COLONOSCOPY: Done within the last 10 years was last done 3-15-17         [facility]  History:     Father:  at age 32; Cause of death was MVA     Mother:  at age 61; Cause of death was CLL     Brother(s): Coronary Artery Disease; Hypertension     Sister(s): Hypertension;  Colon Cancer;  COPD         Social History:     Occupation: Retired (Prior occupation: RN)     Marital Status:          Tobacco/Alcohol/Supplements:     Last Reviewed on 2018 02:28 PM by Yadira Trujillo    Tobacco: She has a past history of cigarette smoking; quit date:  .          Alcohol: Frequency: Daily (glass of wine)             Immunizations:     Prevnar-13 2017     Fluzone (3 + years dose) 2009     Pneomovax      Fluzone High-Dose pf (>=65 yr) 10/16/2013     Fluzone High-Dose pf (>=65 yr) 10/7/2015     Fluzone High-Dose pf (>=65 yr) 2016     Zostavax (Zoster) 2010         Allergies:     Last Reviewed on 2018 02:23 PM by Christiane Boswell    Penicillins:    ACE Inhibitors:    Bactrim:        Current Medications:     Last Reviewed on 2018 02:23 PM by Christiane Boswell    Atenolol 25mg Tablet Take 1 tablet(s) by mouth BID     Multivitamin/Mineral Supplement Tablet one a day     Zyrtec 10mg Tablet 1 tab daily     Caltrate 600 + D         OBJECTIVE:        Vitals:         Current: 2018 2:27:50 PM    Ht:  5 ft, 7 in;  Wt: 122.2 lbs;  BMI: 19.1    T: 97 F (oral);  BP: 124/50 mm Hg (left arm, sitting);  P: 70 bpm (left arm (BP Cuff), sitting);  sCr: 0.8 mg/dL;  GFR: 53.33        Exams:     PHYSICAL EXAM:     GENERAL: vital signs recorded - well developed, well nourished;  no apparent distress;     RESPIRATORY: normal respiratory rate and pattern with no distress; normal breath sounds with no rales, rhonchi, wheezes or rubs;     CARDIOVASCULAR: normal rate; rhythm is regular;  no systolic murmur; no edema; no swelling or redness     MUSCULOSKELETAL: full ROM of right leg/knee, no pain     PSYCHIATRIC:  appropriate affect and demeanor; normal speech pattern; grossly  normal memory;         ASSESSMENT           729.5   M79.604  Leg pain              DDx:     V76.10   Z12.39  Screening for breast cancer              DDx:         ORDERS:         Radiology/Test Orders:       [id number]  Screening digital breast tomosynthesis bi  (Send-Out)                   PLAN:          Leg pain send for AZALIA,right leg pain         TESTS/PROCEDURES:  Will proceed with AZALIA to be performed/scheduled now.      FOLLOW-UP: after dg testing          Screening for breast cancer wants to have the 3D mammogram, will go ahead and set this up, she was here in July and I did her exam, she has dense breast         RADIOLOGY:  I have ordered Screening 3D Mammogram Bilateral to be done today.            Orders:       [id number]  Screening digital breast tomosynthesis bi  (Send-Out)               CHARGE CAPTURE           **Please note: ICD descriptions below are intended for billing purposes only and may not represent clinical diagnoses**        Primary Diagnosis:         729.5 Leg pain            M79.604    Pain in right leg              Orders:          [id number]   Office/outpatient visit; established patient, level 3  (In-House)           V76.10 Screening for breast cancer            Z12.39    Encounter for other screening for malignant neoplasm of breast        ADDENDUMS:      ____________________________________    Date: 01/18/2018 02:56 PM    Author: Leatha Diaz         Visit Note Faxed to:        Safia Beltran (Vascular Medicine); Number [phone]     Health Summary Faxed to:        Safia Beltran (Vascular Medicine); Number [phone]

## 2021-05-18 NOTE — PROGRESS NOTES
Karly Laguerre  1935     Office/Outpatient Visit    Visit Date: Wed, Aug 12, 2020 08:58 am    Provider: Celsa Boswell N.P. (Assistant: Dulce Murillo, )    Location: [facility]        Electronically signed by Celsa Boswell N.P. on  08/12/2020 09:44:27 AM                             Subjective:        CC: Mrs. Laguerre is a 84 year old White female.  This is a follow-up visit.  med refills;         HPI:           Patient to be evaluated for essential (primary) hypertension.  Her current cardiac medication regimen includes a beta-blocker ( Tenormin ).  Mrs. Laguerre does not check her blood pressure other than at her clinic appointments.  She is tolerating the medication well without side effects.  Compliance with treatment has been good; she takes her medication as directed.  The dose of her rx was adjusted by cardiology and she does not need a refill today.           Dx with mixed hyperlipidemia; current treatment includes Lipitor.  Compliance with treatment has been good; she takes her medication as directed.  She denies experiencing any hypercholesterolemia related symptoms.  Most recent lab tests include Glucose, Serum:  103 (mg/dL) (02/13/2020), ALT (SGPT):  17 (U/L) (02/13/2020), Total Cholesterol:  127 (mg/dL) (02/13/2020), HDL:  47 (mg/dL) (02/13/2020), Triglycerides:  84 (mg/dL) (02/13/2020), LDL:  63 (mg/dL) (02/13/2020), Weight (lb):  122.8 (08/12/2020).      ROS:     CONSTITUTIONAL:  Negative for fatigue and fever.      CARDIOVASCULAR:  Positive for sees vascular and on cilostazol / no leg pain.   Negative for chest pain or pedal edema.      RESPIRATORY:  Negative for recent cough and dyspnea.      GASTROINTESTINAL:  Positive for dark red in stool  / 1 month or >.  thinks it is the tomato in her diet     MUSCULOSKELETAL:  Positive for knee pain that interferes in her walking.      NEUROLOGICAL:  Negative for dizziness, headaches, paresthesias, and weakness.          Past Medical  History / Family History / Social History:         Last Reviewed on 2020 09:31 AM by Celsa Boswell    Past Medical History:                 PAST MEDICAL HISTORY         retinal detachment in 1965/ had surgery R         GYNECOLOGICAL HISTORY:             PREVENTIVE HEALTH MAINTENANCE             BONE DENSITY: was last done 19 with the following abnormality noted-- osteopenia     COLORECTAL CANCER SCREENING: Up to date (colonoscopy q10y; sigmoidoscopy q5y; Cologuard q3y) was last done 3-, Results are in chart; colonoscopy with the following abnormalities noted-- severe diverticulosis; dr oneill     EYE EXAM: was last done      MAMMOGRAM: Done within last 2 years and results in are chart was last done 19 stable     PAP SMEAR: No longer indicated due to age and history         PAST MEDICAL HISTORY             CURRENT MEDICAL PROVIDERS:    Ophthalmologist: (name unknown)         Surgical History:         Biopsy of breast; benign    Cataract Removal: bilateral; ;     Cholecystectomy    Dilation and Curettage    Hysterectomy: rectocele repair / BSO;     COLONOSCOPY:         Family History:     Father:  at age 32; Cause of death was MVA     Mother:  at age 61; Cause of death was CLL     Brother(s): 2 brother(s) total;  Coronary Artery Disease; Hypertension     Sister(s): 8 sister(s) total; 2 ;  Hypertension;  Colon Cancer;  COPD         Social History:     Occupation: Retired (Prior occupation: RN)     Marital Status:      Children: 2 children         Tobacco/Alcohol/Supplements:     Last Reviewed on 2020 09:04 AM by Dulce Murillo    Tobacco: She has a past history of cigarette smoking; quit date:  .          Alcohol: Frequency:    (glass of wine) a few days a week;         Substance Abuse History:     Last Reviewed on 2019 11:26 AM by Christiane Boswell        Mental Health History:     Last Reviewed on 2019 11:26 AM by Christiane Boswell         Communicable Diseases (eg STDs):     Last Reviewed on 1/14/2019 11:26 AM by Christiane Boswell        Immunizations:     zzPrevnar-13 1/17/2017    Fluzone (3 + years dose) 9/21/2009    Pneomovax 11/0/2003    Fluzone High-Dose pf (>=65 yr) 10/16/2013    Fluzone High-Dose pf (>=65 yr) 10/7/2015    Fluzone High-Dose pf (>=65 yr) 9/30/2016    Fluzone High-Dose pf (>=65 yr) 10/1/2018    Fluzone High-Dose pf (>=65 yr) 10/7/2019    Influenza Virus Vaccine, unspecified formulation 10/1/2017    Zostavax (Zoster live) 2/24/2010        Allergies:     Last Reviewed on 8/12/2020 09:04 AM by Dulce Murillo    Penicillins:      ACE Inhibitors:      Bactrim:          Current Medications:     Last Reviewed on 8/12/2020 09:05 AM by Dulce Murillo    Zyrtec 10 mg oral tablet [1 tab daily]    atenoloL 25 mg oral tablet [2 in the morning, 1 at night]    Caltrate 600 + D     Multivitamin/Mineral Supplement  Tablet [one a day]    aspirin 81 mg oral tablet,chewable [1 a day]    atorvastatin 10 mg oral tablet [TAKE 1 TABLET BY MOUTH EVERY OTHER DAY]    cilostazoL 100 mg oral tablet [TAKE ONE TABLET BY MOUTH TWICE A DAY]        Objective:        Vitals:         Current: 8/12/2020 9:07:35 AM    Ht:  5 ft, 7 in;  Wt: 122.8 lbs;  BMI: 19.2T: 97.5 F (temporal);  BP: 143/75 mm Hg (left arm, sitting);  P: 67 bpm (left arm (BP Cuff), sitting);  sCr: 0.88 mg/dL;  GFR: 47.02        Exams:     PHYSICAL EXAM:     GENERAL: vital signs recorded - well developed, well nourished;  no apparent distress;     NECK: carotid exam reveals no bruits;     RESPIRATORY: normal respiratory rate and pattern with no distress; normal breath sounds with no rales, rhonchi, wheezes or rubs;     CARDIOVASCULAR: normal rate; rhythm is regular;  no systolic murmur; no edema;     PSYCHIATRIC:  appropriate affect and demeanor; normal speech pattern; grossly normal memory;         Assessment:         I10   Essential (primary) hypertension       E78.2   Mixed  hyperlipidemia       R19.5   Other fecal abnormalities           ORDERS:         Meds Prescribed:       [Refilled] atorvastatin 10 mg oral tablet [TAKE 1 TABLET BY MOUTH EVERY OTHER DAY], #45 (forty five) tablets, Refills: 1 (one)         Lab Orders:       FUTURE  Future order to be done at patients convenience  (Send-Out)            [id number]  [facility] CMP AND LIPID: [id number], [id number]  (Send-Out)            FUTURE  Future order to be done at patients convenience  (Send-Out)            35911  [facility] CBC with 3 part diff  (Send-Out)                      Plan:         Essential (primary) hypertensionsend for holter and other testing and recommendation from cardiology / reviewed note from 2-2020, she said they called her back in April / on atenol 25 2 in am and 1 at HS        Mixed hyperlipidemia        FOLLOW-UP TESTING #1: FOLLOW-UP LABORATORY:  Labs to be scheduled in the future include HTN/Lipid Panel: CMP, Lipid.      RECOMMENDATIONS given include: exercise and low cholesterol/low fat diet.      FOLLOW-UP: fasting for labs           Prescriptions:       [Refilled] atorvastatin 10 mg oral tablet [TAKE 1 TABLET BY MOUTH EVERY OTHER DAY], #45 (forty five) tablets, Refills: 1 (one)           Orders:       FUTURE  Future order to be done at patients convenience  (Send-Out)            [id number]  [facility] CMP AND LIPID: [id number], [id number]  (Send-Out)              Other fecal abnormalitiesreviewed colonoscopy from 2017, may get hemoccults, will check CBC first         FOLLOW-UP TESTING #1: FOLLOW-UP LABORATORY:  Labs to be scheduled in the future include CBC.            Orders:       FUTURE  Future order to be done at patients convenience  (Send-Out)            [id number]  [facility] CBC with 3 part diff  (Send-Out)                  Patient Recommendations:        For  Mixed hyperlipidemia:            The following laboratory testing has been ordered: Maintain a regular exercise program. Reduce the amount of cholesterol and saturated  fat in your diet.          For  Other fecal abnormalities:            The following laboratory testing has been ordered: CBC             Charge Capture:         Primary Diagnosis:     I10  Essential (primary) hypertension           Orders:      [id number]  Office/outpatient visit; established patient, level 4  (In-House)              E78.2  Mixed hyperlipidemia     R19.5  Other fecal abnormalities

## 2021-05-18 NOTE — PROGRESS NOTES
Karly Laguerre 1935     Office/Outpatient Visit    Visit Date: Wed, Dec 19, 2018 02:37 pm    Provider: Salima Orellana N.P. (Assistant: Yadira Trujillo MA)    Location: [facility]        Electronically signed by Salima Orellana N.P. on  2018 03:03:02 PM                             SUBJECTIVE:        CC: Urine problems         HPI:         The patient reports mild urinary frequency.  The urinary symptoms began within the last 2 days.  Associated symptoms include abdominal pain ( suprapubic ), chills, subjective fever,  hematuria and urinary frequency.  She denies associated urgency.  Ms. Laguerre states that she had one prior episode of similar discomfort, which was diagnosed as a simple UTI.  Her most recent prior episode was over 6 years ago.  Pertinent medical history is unremarkable.  She has not been treated previously for the current symptoms.      ROS:     CONSTITUTIONAL:  Negative for fatigue and fever.      CARDIOVASCULAR:  Negative for chest pain, palpitations, tachycardia, orthopnea, and edema.      RESPIRATORY:  Negative for recent cough, dyspnea and frequent wheezing.      GASTROINTESTINAL:  Negative for abdominal pain, constipation, diarrhea, nausea and vomiting.      GENITOURINARY:  Positive for hematuria and frequent urination.   Negative for dysuria, frequent UTI's, nocturia, vaginal discharge or vaginal itching.      PSYCHIATRIC:  Negative for anxiety, depression, and sleep disturbances.          PMH/FMH/SH:     Last Reviewed on 2018 02:53 PM by Salima Orellana    Past Medical History:                 PAST MEDICAL HISTORY         retinal detachment in 1965/ had surgery R         GYNECOLOGICAL HISTORY:             PREVENTIVE HEALTH MAINTENANCE             BONE DENSITY: was last done 14 with the following abnormality noted-- osteopenia     COLORECTAL CANCER SCREENING: colonoscopy with the following abnormalities noted-- severe diverticulosis; dr oneill      MAMMOGRAM: Done within last 2 years and results in are chart was last done 2018 stable         Surgical History:         Biopsy of breast; benign    Cataract Removal: bilateral; 2016;     Cholecystectomy    Dilation and Curettage    Hysterectomy: rectocele repair / BSO;     COLONOSCOPY:         Family History:     Father:  at age 32; Cause of death was MVA     Mother:  at age 61; Cause of death was CLL     Brother(s): Coronary Artery Disease; Hypertension     Sister(s): Hypertension;  Colon Cancer;  COPD         Social History:     Occupation: Retired (Prior occupation: RN)     Marital Status:      Children: 2 children         Tobacco/Alcohol/Supplements:     Last Reviewed on 2018 02:53 PM by Salima Orellana    Tobacco: She has a past history of cigarette smoking; quit date:  .          Alcohol: Frequency: Daily (glass of wine)             Current Problems:     Last Reviewed on 2018 02:53 PM by Salima Orellana    Sacral pain     Light-headedness     Joint pain     Microscopic hematuria     Heel pain     Dizziness     History of urinary stress incontinence     Varicose leg veins     Osteopenia     Essential hypertension     Fatigue     Atrophic vaginitis, postmenopausal     UTI     Rib pain     Screening for breast cancer         Immunizations:     zzPrevnar-13 2017     Fluzone (3 + years dose) 2009     Pneomovax      Fluzone High-Dose pf (>=65 yr) 10/16/2013     Fluzone High-Dose pf (>=65 yr) 10/7/2015     Fluzone High-Dose pf (>=65 yr) 2016     Fluzone High-Dose pf (>=65 yr) 10/1/2018     Influenza Virus Vaccine, unspecified formulation 10/1/2017     Zostavax (Zoster live) 2010         Allergies:     Last Reviewed on 2018 02:53 PM by Salima Orellana    Penicillins:    ACE Inhibitors:    Bactrim:        Current Medications:     Last Reviewed on 2018 02:53 PM by Salima Orellana    Atenolol 25mg Tablet Take 1 tablet(s) by  mouth BID     Multivitamin/Mineral Supplement Tablet one a day     Lipitor 10mg Tablet 1 tab QOD     stazol 100 mg bid     Aspirin (ASA) 81mg Chewable Tablet 1 a day     Zyrtec 10mg Tablet 1 tab daily     Caltrate 600 + D         OBJECTIVE:        Vitals:         Current: 12/19/2018 2:42:34 PM    Ht:  5 ft, 7 in;  Wt: 127 lbs;  BMI: 19.9    T: 98.1 F (oral);  BP: 124/66 mm Hg (left arm, sitting);  P: 98 bpm (right arm (BP Cuff), sitting);  sCr: 0.82 mg/dL;  GFR: 52.04        Exams:     PHYSICAL EXAM:     GENERAL: vital signs recorded - well developed, well nourished;  well groomed;  no apparent distress;     RESPIRATORY: normal respiratory rate and pattern with no distress; normal breath sounds with no rales, rhonchi, wheezes or rubs;     CARDIOVASCULAR: normal rate; rhythm is regular;  no systolic murmur; no edema;     GASTROINTESTINAL: nontender, nondistended; no hepatosplenomegaly or masses; no bruits;     GENITOURINARY: No CVA tenderness;     NEUROLOGIC: GROSSLY INTACT     PSYCHIATRIC:  appropriate affect and demeanor; normal speech pattern; grossly normal memory;         Lab/Test Results:             Glucose, Urine:  Neg (12/19/2018),     Bilirubin, urine:  Negative (12/19/2018),     Ketones, Urine Strip:  Trace (12/19/2018),     Specific Gravity, urine:  1.010 (12/19/2018),     Blood in Urine:  large (12/19/2018),     pH, urine:  5.5 (12/19/2018),     Protein Urine QL:  100 mg (12/19/2018),     Urobilinogen, urine:  0.2 E.U./dL (12/19/2018),     Nitrite, Urine:  Positive (12/19/2018),     Leukoctyes, urine:  Moderate (12/19/2018),     Appearance:  Cloudy (12/19/2018),     collection source:  Clean-catch (12/19/2018),     Color:  Yellow (12/19/2018),     Performed by::  pramod (12/19/2018),     Creatinine, Serum:  0.82 (mg/dl) (11/06/2018),     BUN/Creatinine Ratio:  18 (Ratio) (11/06/2018),     Glom Filt Rate, Est:  >60 (ml/min/1.73m2) (11/06/2018),             ASSESSMENT           599.0   N39.0  UTI               DDx:         ORDERS:         Meds Prescribed:       Macrobid (Nitrofurantoin) 100mg Capsules one BID x 7 days  #14 (Fourteen) capsule(s) Refills: 0         Lab Orders:       [id number]  Urinalysis, automated, without microscopy  (In-House)         98035  [facility] Urine Culture  (Send-Out)                   PLAN:          UTIRepeat urine in 2 weeks. dmt           Prescriptions:       Macrobid (Nitrofurantoin) 100mg Capsules one BID x 7 days  #14 (Fourteen) capsule(s) Refills: 0           Orders:       [id number]  Urinalysis, automated, without microscopy  (In-House)         82066  [facility] Urine Culture  (Send-Out)               CHARGE CAPTURE           **Please note: ICD descriptions below are intended for billing purposes only and may not represent clinical diagnoses**        Primary Diagnosis:         599.0 UTI            N39.0    Urinary tract infection, site not specified              Orders:          [id number]   Office/outpatient visit; established patient, level 3  (In-House)             35742   Urinalysis, automated, without microscopy  (In-House)

## 2021-05-18 NOTE — PROGRESS NOTES
Karly Laguerre  1935     Office/Outpatient Visit    Visit Date:  08:33 am    Provider: Celsa Boswell N.P. (Assistant: Rashida Oleary MA)    Location: [facility]        Electronically signed by Celsa Boswell N.P. on  2020 10:43:53 AM                             Subjective:        CC: Ms. Laguerre is a 84 year old White female.  This is a follow-up visit.  check up, results of DEXA and memory loss;         HPI:       osteopenia     Had a DEXA in , osteopenia, with some decline, here to discuss.  Exercises some.  Takes some calcium with vit d       claudication    The symptom began years ago.  Prior work-up has included an ultrasound ( results: Claudication ).  right leg pain /due follow up with vascular but they have not called her with follow up appt, the [facility] vascular she [facility] has left though           With regard to the mixed hyperlipidemia, current treatment includes Lipitor and she takes QOD.      ROS:     CONSTITUTIONAL:  Positive for fatigue ( mild ).      CARDIOVASCULAR:  Positive for palpitations ( intermittent issue, on atenolol, occurs with exertion, occurs once a week or so ).      RESPIRATORY:  Negative for cough, dyspnea, and hemoptysis.      NEUROLOGICAL:  Positive for she is not noticing any issues with memory.          Past Medical History / Family History / Social History:         Last Reviewed on 2020 10:40 AM by Celsa Boswell    Past Medical History:                 PAST MEDICAL HISTORY         retinal detachment in 1965/ had surgery R         GYNECOLOGICAL HISTORY:             PREVENTIVE HEALTH MAINTENANCE             BONE DENSITY: was last done 19 with the following abnormality noted-- osteopenia     COLORECTAL CANCER SCREENING: Up to date (colonoscopy q10y; sigmoidoscopy q5y; Cologuard q3y) was last done 3-, Results are in chart; colonoscopy with the following abnormalities noted-- severe diverticulosis;   mai     EYE EXAM: was last done      MAMMOGRAM: Done within last 2 years and results in are chart was last done 19 stable     PAP SMEAR: No longer indicated due to age and history         PAST MEDICAL HISTORY             CURRENT MEDICAL PROVIDERS:    Ophthalmologist: (name unknown)         Surgical History:         Biopsy of breast; benign    Cataract Removal: bilateral; 2016;     Cholecystectomy    Dilation and Curettage    Hysterectomy: rectocele repair / BSO;     COLONOSCOPY:         Family History:     Father:  at age 32; Cause of death was MVA     Mother:  at age 61; Cause of death was CLL     Brother(s): 2 brother(s) total;  Coronary Artery Disease; Hypertension     Sister(s): 8 sister(s) total; 2 ;  Hypertension;  Colon Cancer;  COPD         Social History:     Occupation: Retired (Prior occupation: RN)     Marital Status:      Children: 2 children         Tobacco/Alcohol/Supplements:     Last Reviewed on 2019 08:34 AM by Leon Beltran    Tobacco: She has a past history of cigarette smoking; quit date:  .          Alcohol: Frequency:    (glass of wine) a few days a week;         Substance Abuse History:     Last Reviewed on 2019 11:26 AM by Christiane Boswell        Mental Health History:     Last Reviewed on 2019 11:26 AM by Christiane Boswell        Communicable Diseases (eg STDs):     Last Reviewed on 2019 11:26 AM by Christiane Boswell        Immunizations:     zzPrevnar-13 2017    Fluzone (3 + years dose) 2009    Pneomovax     Fluzone High-Dose pf (>=65 yr) 10/16/2013    Fluzone High-Dose pf (>=65 yr) 10/7/2015    Fluzone High-Dose pf (>=65 yr) 2016    Fluzone High-Dose pf (>=65 yr) 10/1/2018    Fluzone High-Dose pf (>=65 yr) 10/7/2019    Influenza Virus Vaccine, unspecified formulation 10/1/2017    Zostavax (Zoster live) 2010        Allergies:     Last Reviewed on 2020 10:42 AM by Celsa Boswell     Penicillins:      ACE Inhibitors:      Bactrim:          Current Medications:     Last Reviewed on 2/12/2020 08:39 AM by Rashida Oleary    Zyrtec 10 mg oral tablet [1 tab daily]    Atenolol 25 mg oral tablet [Take 1 tablet(s) by mouth BID]    Caltrate 600 + D     Multivitamin/Mineral Supplement  Tablet [one a day]    aspirin 81 mg oral tablet,chewable [1 a day]    Lipitor 10 mg oral tablet [1 tab QOD]    cilostazol 100 mg oral tablet [Take one tablet twice daily]        Objective:        Vitals:         Current: 2/12/2020 8:38:42 AM    Ht:  5 ft, 7 in;  Wt: 123.4 lbs;  BMI: 19.3T: 97.2 F (oral);  BP: 127/58 mm Hg (right arm, sitting);  P: 78 bpm (right arm (BP Cuff), sitting);  sCr: 0.84 mg/dL;  GFR: 49.37        Exams:     PHYSICAL EXAM:     GENERAL: vital signs recorded - well developed, well nourished;  no apparent distress;     NECK: carotid exam reveals no bruits;     RESPIRATORY: normal respiratory rate and pattern with no distress; normal breath sounds with no rales, rhonchi, wheezes or rubs;     CARDIOVASCULAR: normal rate; rhythm is regular;  no systolic murmur; no edema;     PSYCHIATRIC:  appropriate affect and demeanor; normal speech pattern; grossly normal memory;         Assessment:         M85.80   Other specified disorders of bone density and structure, unspecified site       Z86.79   Personal history of other diseases of the circulatory system       R00.2   Palpitations       E78.2   Mixed hyperlipidemia           ORDERS:         Lab Orders:       FUTURE  Future order to be done at patients convenience  (Send-Out)            35586  V[facility] - [facility] Vitamin D, 25 Hydroxy  (Send-Out)            FUTURE  Future order to be done at patients convenience  (Send-Out)            70668  [facility] - [facility] CMP AND LIPID: [id number], [id number]  (Send-Out)              Procedures Ordered:       REFER  Referral to Specialist or Other Facility  (Send-Out)            REFER  Referral to Specialist or Other Facility  (Send-Out)                       Plan:         Other specified disorders of bone density and structure, unspecified sitereviewed DEXA, will check a vit d         RECOMMENDATIONS given include: regular exercise.      FOLLOW-UP TESTING #1: FOLLOW-UP LABORATORY:  Labs to be scheduled in the future include Vitamin D 25.            Orders:       FUTURE  Future order to be done at patients convenience  (Send-Out)            63272  VITD - [facility] Vitamin D, 25 Hydroxy  (Send-Out)              Personal history of other diseases of the circulatory systemsend her back to [facility] vascular         REFERRALS:  Referral initiated to Vascular Surgeon [facility].            Orders:       REFER  Referral to Specialist or Other Facility  (Send-Out)              Palpitationsreviewed previous ECHO / holter / cardiology note        REFERRALS:  Referral initiated to a cardiologist ( Norman Oneil, and Santos ([facility]); palpiations ).            Orders:       REFER  Referral to Specialist or Other Facility  (Send-Out)              Mixed hyperlipidemia        FOLLOW-UP TESTING #1: FOLLOW-UP LABORATORY:  Labs to be scheduled in the future include HTN/Lipid Panel: CMP, Lipid.      RECOMMENDATIONS given include: exercise and low cholesterol/low fat diet.      FOLLOW-UP: fasting for labs           Orders:       FUTURE  Future order to be done at patients convenience  (Send-Out)            08341  [facility] - [facility] CMP AND LIPID: [id number], [id number]  (Send-Out)                  Patient Recommendations:        For  Other specified disorders of bone density and structure, unspecified site:            The following laboratory testing has been ordered:         For  Palpitations:    I also recommend palpiations.          For  Mixed hyperlipidemia:            The following laboratory testing has been ordered: Maintain a regular exercise program. Reduce the amount of cholesterol and saturated fat in your diet.              Charge Capture:         Primary Diagnosis:     M85.80  Other specified  disorders of bone density and structure, unspecified site           Orders:      62717  Office/outpatient visit; established patient, level 4  (In-House)              Z86.79  Personal history of other diseases of the circulatory system     R00.2  Palpitations     E78.2  Mixed hyperlipidemia         ADDENDUMS:      ____________________________________    Addendum: 02/20/2020 10:01 AM - Two, Team         Visit Note Faxed to:        Dr Graham; Number [phone]

## 2021-05-18 NOTE — PROGRESS NOTES
Karly Laguerre  1935     Office/Outpatient Visit    Visit Date: Mon, Nov 25, 2019 08:31 am    Provider: Celsa Boswell N.P. (Assistant: Leon Beltran, )    Location: [facility]        Electronically signed by Celsa Boswell N.P. on  11/25/2019 05:12:50 PM                             Subjective:        CC: Ms. Laguerre is a 84 year old White female.  medicare wellness;         HPI:           Ms. Laguerre is here for a Medicare wellness visit.  The required HRA questions are integrated within this visit note. Family medical history and individual medical/surgical history were reviewed and updated.  A current height, weight, BMI, blood pressure, and pulse were recorded in the vitals section of the note and have been reviewed. Patient's medications, including supplements, were recorded in the chart and reviewed.  Current providers and suppliers were reviewed and updated.          Self-Assessment of Health: She rates her health as good. She rates her confidence of being able to control/manage most of her health problems as somewhat confident. Her physical/emotional health has limited her social activites moderately.  A review of cognitive impairment was performed, including ability to drive a car, manage finances, and any memory changes, and was found to be negative.  A review of functional ability, including bathing, dressing, walking, and urine/bowel continence as well as level of safety was performed and was found to be negative.  Falls Risk: Has not had any falls or only one fall without injury in the past year.  She denies having trouble hearing the TV/radio when others do not, having to strain to hear or understand conversations and wearing hearing aid(s).  Concerning home safety, she reports that at home she DOES have adequate lighting, a skid resistant shower/tub and functioning smoke alarms, but not grab bars in the bath or absence of throw rugs.          Immunization Status: ** >10 years  since last Td booster; Physical Activity: She exercises but less than 20 minutes 3 days per week; Type of diet patient normally eats is described as well-balanced with fruits and vegetables Tobacco: Past history of cigarette smoking, but has quit.  Preventative Health updated today       (Mild)     PHQ-9 Depression Screening: Completed form scanned and in chart; Total Score 2           Additionally, she presents with history of essential (primary) hypertension.  her current cardiac medication regimen includes a beta-blocker ( Tenormin ).  Ms. Laguerre does not check her blood pressure other than at her clinic appointments.  She is tolerating the medication well without side effects.  Compliance with treatment has been good; she takes her medication as directed.            With regard to the mixed hyperlipidemia, current treatment includes Lipitor.  Compliance with treatment has been good; she takes her medication as directed.  She denies experiencing any hypercholesterolemia related symptoms.  Most recent lab tests include    Fasting Labs: and Glucose, Serum:  107 (mg/dL) (07/17/2019), ALT (SGPT):  14 (U/L) (05/21/2019), Total Cholesterol:  132 (mg/dL) (03/06/2019), HDL:  49 (mg/dL) (03/06/2019), Triglycerides:  72 (mg/dL) (03/06/2019), LDL:  69 (mg/dL) (03/06/2019).      ROS:     CONSTITUTIONAL:  Positive for fatigue.   Negative for chills or fever.      EYES:  Negative for blurred vision.      E/N/T:  Negative for diminished hearing and nasal congestion.      CARDIOVASCULAR:  Positive for palpitations ( occ but a skipped beat, does not happen often, does sometimes take an additional dose of beta blocker ).   Negative for chest pain.      RESPIRATORY:  Negative for recent cough and dyspnea.      GASTROINTESTINAL:  Negative for abdominal pain, nausea and vomiting.      GENITOURINARY:  Negative for dysuria.      MUSCULOSKELETAL:  Negative for arthralgias and myalgias.      INTEGUMENTARY/BREAST:  Negative for atypical  mole(s) and rash.      NEUROLOGICAL:  Positive for denies any memory issues.   Negative for paresthesias or weakness.      PSYCHIATRIC:  Positive for insomnia.   Negative for anxiety or depression.          Past Medical History / Family History / Social History:         Last Reviewed on 2019 04:09 PM by Celsa Boswell    Past Medical History:                 PAST MEDICAL HISTORY         retinal detachment in 1965/ had surgery R         GYNECOLOGICAL HISTORY:             PREVENTIVE HEALTH MAINTENANCE             BONE DENSITY: was last done 14 with the following abnormality noted-- osteopenia     COLORECTAL CANCER SCREENING: Up to date (colonoscopy q10y; sigmoidoscopy q5y; Cologuard q3y) was last done 3-2017, Results are in chart; colonoscopy with the following abnormalities noted-- severe diverticulosis; dr oneill     EYE EXAM: was last done      MAMMOGRAM: Done within last 2 years and results in are chart was last done 2018 stable     PAP SMEAR: No longer indicated due to age and history         PAST MEDICAL HISTORY             CURRENT MEDICAL PROVIDERS:    Ophthalmologist: (name unknown)         Surgical History:         Biopsy of breast; benign    Cataract Removal: bilateral; ;     Cholecystectomy    Dilation and Curettage    Hysterectomy: rectocele repair / BSO;     COLONOSCOPY:         Family History:     Father:  at age 32; Cause of death was MVA     Mother:  at age 61; Cause of death was CLL     Brother(s): 2 brother(s) total;  Coronary Artery Disease; Hypertension     Sister(s): 8 sister(s) total; 2 ;  Hypertension;  Colon Cancer;  COPD         Social History:     Occupation: Retired (Prior occupation: RN)     Marital Status:      Children: 2 children         Tobacco/Alcohol/Supplements:     Last Reviewed on 2019 08:34 AM by Leon Beltran    Tobacco: She has a past history of cigarette smoking; quit date:  .          Alcohol: Frequency:     (glass of wine) a few days a week;         Substance Abuse History:     Last Reviewed on 1/14/2019 11:26 AM by Christiane Boswell        Mental Health History:     Last Reviewed on 1/14/2019 11:26 AM by Christiane Boswell        Communicable Diseases (eg STDs):     Last Reviewed on 1/14/2019 11:26 AM by Christiane Boswell        Immunizations:     zzPrevnar-13 1/17/2017    Fluzone (3 + years dose) 9/21/2009    Pneomovax 11/0/2003    Fluzone High-Dose pf (>=65 yr) 10/16/2013    Fluzone High-Dose pf (>=65 yr) 10/7/2015    Fluzone High-Dose pf (>=65 yr) 9/30/2016    Fluzone High-Dose pf (>=65 yr) 10/1/2018    Fluzone High-Dose pf (>=65 yr) 10/7/2019    Influenza Virus Vaccine, unspecified formulation 10/1/2017    Zostavax (Zoster live) 2/24/2010        Allergies:     Last Reviewed on 11/25/2019 08:34 AM by Leon Beltran    Penicillins:      ACE Inhibitors:      Bactrim:          Current Medications:     Last Reviewed on 11/25/2019 09:03 AM by Celsa Boswell    Zyrtec 10 mg oral tablet [1 tab daily]    Atenolol 25 mg oral tablet [Take 1 tablet(s) by mouth BID]    Caltrate 600 + D     Multivitamin/Mineral Supplement  Tablet [one a day]    aspirin 81 mg oral tablet,chewable [1 a day]    Lipitor 10mg Tablet [1 tab QOD]    CILOSTAZOL   TAB 100MG        Objective:        Vitals:         Current: 11/25/2019 8:37:52 AM    Ht:  5 ft, 7 in;  Wt: 122.4 lbs;  BMI: 19.2BP: 122/58 mm Hg (right arm, sitting);  P: 84 bpm (right arm (BP Cuff), sitting);  sCr: 0.97 mg/dL;  GFR: 42.60VA: 20/40 OD, 20/40 OS (near, with correction)        Exams:     PHYSICAL EXAM:     GENERAL: vital signs recorded - well developed, well nourished;  no apparent distress;     EYES: extraocular movements intact; conjunctiva and cornea are normal; PERRL;     E/N/T:  normal EACs, TMs, nasal/oral mucosa, teeth, gingiva, and oropharynx;     NECK: range of motion is normal; thyroid is non-palpable;     RESPIRATORY: normal respiratory rate and pattern with no  distress; normal breath sounds with no rales, rhonchi, wheezes or rubs;     CARDIOVASCULAR: normal rate; rhythm is regular;  no systolic murmur; no edema;     GASTROINTESTINAL: nontender; normal bowel sounds; no organomegaly;     LYMPHATIC: no enlargement of cervical or facial nodes; no axillary adenopathy;     BREAST/INTEGUMENT: BREASTS: breast exam is normal without masses, skin changes, or nipple discharge; SKIN: no significant rashes or lesions; no suspicious moles;     MUSCULOSKELETAL:  Normal range of motion, strength and tone;     NEUROLOGIC: GROSSLY INTACT     PSYCHIATRIC: appropriate affect and demeanor; normal psychomotor function;         Assessment:         Z00.00   Encounter for general adult medical examination without abnormal findings       V79.0   Screening for depression   (Mild)     Z13.31   Encounter for screening for depression       I10   Essential (primary) hypertension       E78.2   Mixed hyperlipidemia       Z12.31   Encounter for screening mammogram for malignant neoplasm of breast       M85.80   Other specified disorders of bone density and structure, unspecified site       R53.83   Other fatigue           ORDERS:         Meds Prescribed:       [Refilled] Lipitor 10 mg oral tablet [1 tab QOD], #45 (forty five) tablets, Refills: 0 (zero)       [Refilled] Atenolol 25 mg oral tablet [Take 1 tablet(s) by mouth BID], #180 (one hundred and eighty) tablets, Refills: 1 (one)         Radiology/Test Orders:       47117  DXA, bone density study, 1 or more sites; axial skeleton (eg hips, pelvis, spine)  (Send-Out)            92402  Screening digital breast tomosynthesis bi  (Send-Out)              Lab Orders:       [id number]  COMP - [facility] Comp. Metabolic Panel  (Send-Out)            98200  TSH - [facility] TSH  (Send-Out)              Procedures Ordered:         Annual wellness visit, includes a PPPS, subsequent visit  (In-House)              Other Orders:         Depression screen negative  (In-House)             1101F  Pt screen for fall risk; document no falls in past year or only 1 fall w/o injury in past year (SIMON)  (In-House)                      Plan:         Encounter for general adult medical examination without abnormal findingsadvised to get shingrex and adacel at her pharmacy, rx given     ADVANCED DIRECTIVES: None         COUNSELING provided on: fall prevention, healthy eating habits, regular exercise, use of seat belts, Advance Directive info given., and ADVISED TO SEE AN EYE DOCTOR AND DENTIST REGULARLY.            Orders:         Annual wellness visit, includes a PPPS, subsequent visit  (In-House)              Encounter for screening for depression    MIPS PHQ-9 Depression Screening: Completed form scanned and in chart; Total Score 2; Negative Depression Screen           Orders:         Depression screen negative  (In-House)            1101F  Pt screen for fall risk; document no falls in past year or only 1 fall w/o injury in past year (SIMON)  (In-House)              Essential (primary) [id number] was last stress test, reviewed last lipid, not fasting today    LABORATORY:  Labs ordered to be performed today include Comprehensive metabolic panel.      FOLLOW-UP: pending labs           Prescriptions:       [Refilled] Atenolol 25 mg oral tablet [Take 1 tablet(s) by mouth BID], #180 (one hundred and eighty) tablets, Refills: 1 (one)           Orders:       [id number]  COMP - [facility] Comp. Metabolic Panel  (Send-Out)              Mixed hyperlipidemia          Prescriptions:       [Refilled] Lipitor 10 mg oral tablet [1 tab QOD], #45 (forty five) tablets, Refills: 0 (zero)         Encounter for screening mammogram for malignant neoplasm of breast        RADIOLOGY:  I have ordered Mammogram Bilateral Screening 3D to be done today.            Orders:       [id number]  Screening digital breast tomosynthesis bi  (Send-Out)              Other specified disorders of bone density and structure, unspecified site         RADIOLOGY:  I have ordered Dexa Scan to be done today.            Orders:       [id number]  DXA, bone density study, 1 or more sites; axial skeleton (eg hips, pelvis, spine)  (Send-Out)              Other fatigue    LABORATORY:  Labs ordered to be performed today include TSH.            Orders:       [id number]  TSH - [facility] TSH  (Send-Out)                  Patient Recommendations:        For  Encounter for general adult medical examination without abnormal findings:        Regularly exercise within recommended guidelines, especially to maintain balance. Remove obstacles in walkways at home.  Use non-skid material for bathtub safety.  Remove loose throw rugs from floor. Use nightlights in bedrooms, hallways, and bathrooms.    Limit dietary intake of fat (especially saturated fat) and cholesterol.  Eat a variety of foods, including plenty of fruits, vegetables, and grain containg fiber, limit fat intake to 30% of total calories. Balance caloric intake with energy expended.    Maintaining regular physical activity is advised to help prevent heart disease, hypertension, diabetes, and obesity.    Always use shoulder/lap restraints when driving or riding in a vehicle, even those equipped with air bags.              Charge Capture:         Primary Diagnosis:     Z00.00  Encounter for general adult medical examination without abnormal findings           Orders:        Annual wellness visit, includes a PPPS, subsequent visit  (In-House)              V79.0  Screening for depression     Z13.31  Encounter for screening for depression           Orders:        Depression screen negative  (In-House)            1101F  Pt screen for fall risk; document no falls in past year or only 1 fall w/o injury in past year (SIMON)  (In-House)              I10  Essential (primary) hypertension     E78.2  Mixed hyperlipidemia     Z12.31  Encounter for screening mammogram for malignant neoplasm of breast     M85.80  Other specified disorders of  bone density and structure, unspecified site     R53.83  Other fatigue

## 2021-05-18 NOTE — PROGRESS NOTES
Karly Laguerre  1935     Office/Outpatient Visit    Visit Date: Wed, Jan 6, 2021 11:11 am    Provider: Celsa Boswell N.P. (Assistant: Adrianne Gracia MA)    Location: [facility]        Electronically signed by Celsa Boswell N.P. on  01/06/2021 12:38:35 PM                             Subjective:        CC: Has not been taking Atorvastatin, did not know what it was for.Mrs. Laguerre is a 85 year old White female.  This is a follow-up visit.          HPI:           Patient presents with essential (primary) hypertension.  Her current cardiac medication regimen includes a diuretic ( Hydrochlorothiazide (HCTZ) ) and a beta-blocker ( Tenormin ).  she first said she only took tenormin and then said she had the HCTZ and takes it some and it takes half dose when she does, was given #30 in , not filled since then She did not bring her blood pressure diary, but says that typical readings show systolics in the 120s to 120-140's and diastolics in the 70-80 range.  She is tolerating the medication well without side effects.  Compliance with treatment has been good; she takes her medication as directed.            Dx with mixed hyperlipidemia; current treatment includes unsure if on lipitor or not.  Most recent lab tests include Glucose, Serum:  99 (mg/dL) (11/16/2020), ALT (SGPT):  15 (U/L) (11/16/2020), HDL:  49 (mg/dL) (08/13/2020), LDL:  79 (mg/dL) (08/13/2020), Total Cholesterol:  144 (mg/dL) (08/13/2020), Triglycerides:  80 (mg/dL) (08/13/2020), Weight (lb):  121.0 (08/20/2020).      ROS:     CONSTITUTIONAL:  Negative for fever.      CARDIOVASCULAR:  Negative for chest pain, palpitations, tachycardia, orthopnea, and edema.      RESPIRATORY:  Negative for recent cough and dyspnea.      NEUROLOGICAL:  Positive for family has reported that she is having memory issues.          Past Medical History / Family History / Social History:         Last Reviewed on 1/06/2021 11:25 AM by Andreia  Celsa BALBUENA    Past Medical History:                 PAST MEDICAL HISTORY         Lung cancer: dx'd in ; Stage 1 adenocarcinoma; VATS procedure / path benign process;     retinal detachment in 1965/ had surgery R         GYNECOLOGICAL HISTORY:             PREVENTIVE HEALTH MAINTENANCE             BONE DENSITY: was last done 19 with the following abnormality noted-- osteopenia     COLORECTAL CANCER SCREENING: Up to date (colonoscopy q10y; sigmoidoscopy q5y; Cologuard q3y) was last done 3-, Results are in chart; colonoscopy with the following abnormalities noted-- severe diverticulosis; dr oneill     EYE EXAM: was last done      MAMMOGRAM: Done within last 2 years and results in are chart was last done 19 stable     PAP SMEAR: No longer indicated due to age and history         PAST MEDICAL HISTORY             CURRENT MEDICAL PROVIDERS:    Ophthalmologist: (name unknown)         Surgical History:         Biopsy of breast; benign    Cataract Removal: bilateral; ;     Cholecystectomy    Dilation and Curettage    Hysterectomy: rectocele repair / BSO;     COLONOSCOPY:     Positive for    VATS 10-13-20;;         Family History:     Father:  at age 32; Cause of death was MVA     Mother:  at age 61; Cause of death was CLL     Brother(s): 2 brother(s) total;  Coronary Artery Disease; Hypertension     Sister(s): 8 sister(s) total; 2 ;  Hypertension;  Colon Cancer;  COPD         Social History:     Occupation: Retired (Prior occupation: RN)     Marital Status:      Children: 2 children         Tobacco/Alcohol/Supplements:     Last Reviewed on 2021 11:18 AM by Adrianne Gracia    Tobacco: She has a past history of cigarette smoking; quit date:  .          Alcohol: Frequency:    (glass of wine) a few days a week;         Substance Abuse History:     Last Reviewed on 2019 11:26 AM by Christiane Boswell        Mental Health History:     Last Reviewed on 2019  11:26 AM by Christiane Boswell        Communicable Diseases (eg STDs):     Last Reviewed on 1/14/2019 11:26 AM by Christiane Boswell        Immunizations:     influenza, high-dose, quadrivalent (FLUZONE HIGH-DOSE QUAD 2020-21) 9/22/2020    zzPrevnar-13 1/17/2017    Fluzone (3 + years dose) 9/21/2009    Pneomovax 11/0/2003    Fluzone High-Dose pf (>=65 yr) 10/16/2013    Fluzone High-Dose pf (>=65 yr) 10/7/2015    Fluzone High-Dose pf (>=65 yr) 9/30/2016    Fluzone High-Dose pf (>=65 yr) 10/1/2018    Fluzone High-Dose pf (>=65 yr) 10/7/2019    Influenza Virus Vaccine, unspecified formulation 10/1/2017    Zostavax (Zoster live) 2/24/2010        Allergies:     Last Reviewed on 1/06/2021 11:25 AM by Celsa Boswell    Penicillins:      ACE Inhibitors:      Bactrim:          Current Medications:     Last Reviewed on 1/06/2021 11:25 AM by Celsa Boswell    atenoloL 25 mg oral tablet [2 in the morning, 1 at night]    Caltrate 600 + D     Multivitamin/Mineral Supplement  Tablet [one a day]    aspirin 81 mg oral tablet,chewable [1 a day]    atorvastatin 10 mg oral tablet [TAKE 1 TABLET BY MOUTH EVERY OTHER DAY]    cilostazoL 100 mg oral tablet [TAKE ONE TABLET BY MOUTH TWICE A DAY]    IBUPROFEN    TAB 400MG     hydroCHLOROthiazide 25 mg oral tablet [Take 1/2 - 1 tablet(s) by mouth daily]        Objective:        Vitals:         Current: 1/6/2021 11:20:46 AM    Ht:  5 ft, 7 in;  Wt: 113.6 lbs;  BMI: 17.8T: 96.2 F (temporal);  BP: 152/75 mm Hg (left arm, sitting);  P: 72 bpm (left arm (BP Cuff), sitting);  sCr: 0.76 mg/dL;  GFR: 51.81        Repeat:     11:41:16 AM  BP:   138/66mm Hg (right arm, sitting, HR: 71)     Exams:     PHYSICAL EXAM:     GENERAL: vital signs recorded - well developed, well nourished, thin;  no apparent distress;     NECK: carotid exam reveals no bruits;     RESPIRATORY: normal respiratory rate and pattern with no distress; normal breath sounds with no rales, rhonchi, wheezes or rubs;      CARDIOVASCULAR: normal rate; rhythm is regular;  no systolic murmur; no edema;     PSYCHIATRIC: affect/demeanor: anxious;         Assessment:         I10   Essential (primary) hypertension       E78.2   Mixed hyperlipidemia       R63.4   Abnormal weight loss           ORDERS:         Lab Orders:       [id number]  TSH - [facility] TSH  (Send-Out)            19959  LPDP - [facility] Lipid Panel  (Send-Out)            95071  B12FO - [facility] Vitamin B12 with Folate  (Send-Out)            54054  BDCBC - [facility] CBC with 3 part diff  (Send-Out)            58110  TSH - [facility] TSH  (Send-Out)            69270  COMP - [facility] Comp. Metabolic Panel  (Send-Out)                      Plan:         Essential (primary) hypertensionshe asked about vaccines, will check with shanae for tetanus and shingles vaccines    LABORATORY:  Labs ordered to be performed today include Comprehensive metabolic panel and TSH.      RECOMMENDATIONS given include: perform routine monitoring of blood pressure with home blood pressure cuff and take medication as prescribed, try not to miss doses.            Orders:       [id number]  TSH - [facility] TSH  (Send-Out)            83426  COMP - [facility] Comp. Metabolic Panel  (Send-Out)              Mixed hyperlipidemiawill check labs today, rx was filled in June for #45 pills, not filled since then (she reports she ate waffles this am)    LABORATORY:  Labs ordered to be performed today include lipid panel.      FOLLOW-UP: pending labs           Orders:       [id number]  LPDP - [facility] Lipid Panel  (Send-Out)              Abnormal weight lossshe has lost weight, will check some labs, may need to see neurology     LABORATORY:  Labs ordered to be performed today include B12 with Folate, CBC, and TSH.            Orders:       [id number]  B12FO - [facility] Vitamin B12 with Folate  (Send-Out)            39654  BDCBC - [facility] CBC with 3 part diff  (Send-Out)            90902  TSH - [facility] TSH  (Send-Out)                  Patient Recommendations:        For  Essential (primary)  hypertension:    Begin monitoring your blood pressure by brief nurse visits at our office, a home blood pressure monitor, or by checking on the machines in pharmacies or stores.  Keep a log of the readings.              Charge Capture:         Primary Diagnosis:     I10  Essential (primary) hypertension           Orders:      [id number]  Office/outpatient visit; established patient, level 4  (In-House)              E78.2  Mixed hyperlipidemia     R63.4  Abnormal weight loss

## 2021-05-18 NOTE — PROGRESS NOTES
Karly Laguerre  1935     Office/Outpatient Visit    Visit Date: Thu, Aug 20, 2020 10:52 am    Provider: Celsa Boswell N.P. (Assistant: Teodora Champagne MA)    Location: [facility]        Electronically signed by Celsa Boswell N.P. on  2020 03:39:55 PM                             Subjective:        CC: Mrs. Laguerre is a 84 year old White female.  This is a follow-up visit.  went to er yesterday with chest pain; pt states she is not taking aspirin         HPI:           Mrs. Laguerre presents in follow up from ER. She was seen in the ER on 20.  She was diagnosed with chest pain, lung mass.  The following lab tests were done: troponin I ( normal ), creatinine ( 0.95 ), d dimer elevated 0.93.  The following radiology tests were done: chest CT ( no PE, irregular density in the lingula, opacity in left upper lobe ), chest x-ray ( nothing acute ).  The following procedures were done: EKG ( NSR ) The patient received no treatment or prescriptions.  The patient's course has improved.      ROS:     CONSTITUTIONAL:  Negative for fever.      CARDIOVASCULAR:  Negative for palpitations ( pt reports she  has history of Afib, has tried to get back in with cardiology and they don't have an available opening ).      RESPIRATORY:  Negative for recent cough and dyspnea.      GASTROINTESTINAL:  Negative for more blood seen in her stool.      NEUROLOGICAL:  Negative for dizziness, headaches, paresthesias, and weakness.          Past Medical History / Family History / Social History:         Last Reviewed on 2020 12:21 PM by Celsa Boswell    Past Medical History:                 PAST MEDICAL HISTORY         retinal detachment in 1965/ had surgery R         GYNECOLOGICAL HISTORY:             PREVENTIVE HEALTH MAINTENANCE             BONE DENSITY: was last done 19 with the following abnormality noted-- osteopenia     COLORECTAL CANCER SCREENING: Up to date (colonoscopy q10y; sigmoidoscopy  q5y; Cologuard q3y) was last done 3-2017, Results are in chart; colonoscopy with the following abnormalities noted-- severe diverticulosis; dr oneill     EYE EXAM: was last done      MAMMOGRAM: Done within last 2 years and results in are chart was last done 19 stable     PAP SMEAR: No longer indicated due to age and history         PAST MEDICAL HISTORY             CURRENT MEDICAL PROVIDERS:    Ophthalmologist: (name unknown)         Surgical History:         Biopsy of breast; benign    Cataract Removal: bilateral; 2016;     Cholecystectomy    Dilation and Curettage    Hysterectomy: rectocele repair / BSO;     COLONOSCOPY:         Family History:     Father:  at age 32; Cause of death was MVA     Mother:  at age 61; Cause of death was CLL     Brother(s): 2 brother(s) total;  Coronary Artery Disease; Hypertension     Sister(s): 8 sister(s) total; 2 ;  Hypertension;  Colon Cancer;  COPD         Social History:     Occupation: Retired (Prior occupation: RN)     Marital Status:      Children: 2 children         Tobacco/Alcohol/Supplements:     Last Reviewed on 2020 10:52 AM by Teodora Champagne    Tobacco: She has a past history of cigarette smoking; quit date:  .          Alcohol: Frequency:    (glass of wine) a few days a week;         Substance Abuse History:     Last Reviewed on 2019 11:26 AM by Christiane Boswell        Mental Health History:     Last Reviewed on 2019 11:26 AM by Christiane Boswell        Communicable Diseases (eg STDs):     Last Reviewed on 2019 11:26 AM by Christiane Boswell        Immunizations:     zzPrevnar-13 2017    Fluzone (3 + years dose) 2009    Pneomovax     Fluzone High-Dose pf (>=65 yr) 10/16/2013    Fluzone High-Dose pf (>=65 yr) 10/7/2015    Fluzone High-Dose pf (>=65 yr) 2016    Fluzone High-Dose pf (>=65 yr) 10/1/2018    Fluzone High-Dose pf (>=65 yr) 10/7/2019    Influenza Virus Vaccine, unspecified  formulation 10/1/2017    Zostavax (Zoster live) 2/24/2010        Allergies:     Last Reviewed on 8/20/2020 10:52 AM by Teodora Champagne    Penicillins:      ACE Inhibitors:      Bactrim:          Current Medications:     Last Reviewed on 8/20/2020 10:52 AM by Teodora Champagne    Zyrtec 10 mg oral tablet [1 tab daily]    atenoloL 25 mg oral tablet [2 in the morning, 1 at night]    Caltrate 600 + D     Multivitamin/Mineral Supplement  Tablet [one a day]    aspirin 81 mg oral tablet,chewable [1 a day]    atorvastatin 10 mg oral tablet [TAKE 1 TABLET BY MOUTH EVERY OTHER DAY]    cilostazoL 100 mg oral tablet [TAKE ONE TABLET BY MOUTH TWICE A DAY]        Objective:        Vitals:         Current: 8/20/2020 10:58:02 AM    Ht:  5 ft, 7 in;  Wt: 121 lbs;  BMI: 19.0T: 97.6 F (temporal);  BP: 146/72 mm Hg (right arm, sitting);  P: 61 bpm (right arm (BP Cuff), sitting);  sCr: 1.06 mg/dL;  GFR: 38.80        Exams:     PHYSICAL EXAM:     GENERAL: vital signs recorded - well developed, well nourished;  no apparent distress;     NECK: carotid exam reveals no bruits;     RESPIRATORY: normal respiratory rate and pattern with no distress; normal breath sounds with no rales, rhonchi, wheezes or rubs;     CARDIOVASCULAR: normal rate; rhythm is regular;  no systolic murmur; no edema;     PSYCHIATRIC:  appropriate affect and demeanor; normal speech pattern; grossly normal memory;         Assessment:         R07.9   Chest pain, unspecified       R91.8   Other nonspecific abnormal finding of lung field       R19.5   Other fecal abnormalities       I48.91   Unspecified atrial fibrillation           ORDERS:         Radiology/Test Orders:       [id number]  PET w/CT for attenuation correction/anatomical localization imaging; skull base to mid-thigh  (Send-Out)              Lab Orders:       FUTURE  Future order to be done at patients convenience  (Send-Out)            [id number]  [facility] Occult blood by immunoassay  (Send-Out)              Procedures  Ordered:       REFER  Referral to Specialist or Other Facility  (Send-Out)                      Plan:         Chest pain, unspecifiedreviewed ER note, consult note from cardiology earlier this year, she was suppose to have some testing and it had not been done (ECHO and Lexiscan, but did have a holter, sent for that and then will see about her follow up, send them her recent EKG)        Other nonspecific abnormal finding of lung fieldreviewed ER labs, CXR and CT of chest, consulted with Dr Witt regarding the next step, will proceed with CT PET scan of her chest, then set up a pulm consult        RADIOLOGY:  I have ordered PET Scan to be done today.  PET/CT chest           Orders:       [id number]  PET w/CT for attenuation correction/anatomical localization imaging; skull base to mid-thigh  (Send-Out)              Other fecal abnormalitiesher renal function improved, reviewed recent renal function and the labs done at [facility], hemoccult cards given to pt         FOLLOW-UP:.  :for pending lab results     FOLLOW-UP TESTING #1: FOLLOW-UP LABORATORY:  Labs to be scheduled in the future include Hemoccult.            Orders:       FUTURE  Future order to be done at patients convenience  (Send-Out)            [id number]  [facility] Occult blood by immunoassay  (Send-Out)              Unspecified atrial fibrillationto set up the cardiology for a follow up        REFERRALS:  Referral initiated to a cardiologist ( Norman Oneil, and Santos ([facility]); to evaluate chest pain ).            Orders:       REFER  Referral to Specialist or Other Facility  (Send-Out)                  Patient Recommendations:        For  Other fecal abnormalities:                    APPOINTMENT INFORMATION:        Monday Tuesday Wednesday Thursday Friday Saturday Sunday            Time:___________________AM  PM   Date:_____________________         The following laboratory testing has been ordered:             Charge Capture:         Primary  Diagnosis:     R07.9  Chest pain, unspecified           Orders:      [id number]  Office/outpatient visit; established patient, level 4  (In-House)              R91.8  Other nonspecific abnormal finding of lung field     R19.5  Other fecal abnormalities     I48.91  Unspecified atrial fibrillation

## 2021-05-18 NOTE — PROGRESS NOTES
Karly Laguerre  1935     Office/Outpatient Visit    Visit Date:  03:31 pm    Provider: Celsa Boswell N.P. (Assistant: Padmini Navarro MA)    Location: [facility]        Electronically signed by Celsa Boswell N.P. on  2020 08:42:58 PM                             Subjective:        CC: Mrs. Laguerre is a 85 year old White female.  Patient presents today with complaints of HTN;         HPI:           Patient presents with essential (primary) hypertension.  Her current cardiac medication regimen includes a beta-blocker ( Tenormin ).  She did not bring her blood pressure diary, but says that typical readings show systolics in the 160s and diastolics in the 70s.  She is tolerating the medication well without side effects.  Compliance with treatment has been good; she takes her medication as directed.      ROS:     CONSTITUTIONAL:  Negative for fever.      CARDIOVASCULAR:  Negative for chest pain, palpitations, tachycardia, orthopnea, and edema.      RESPIRATORY:  Negative for recent cough and dyspnea.      GASTROINTESTINAL:  Positive for take rx daily for her constipaton.      MUSCULOSKELETAL:  Positive for takes muscle relaxer daily / Ibuprofen sometimes.      NEUROLOGICAL:  Positive for headaches ( recent ).          Past Medical History / Family History / Social History:         Last Reviewed on 2020 03:50 PM by Celsa Boswell    Past Medical History:                 PAST MEDICAL HISTORY         Lung cancer: dx'd in ; Stage 1 adenocarcinoma; VATS procedure / path benign process;     retinal detachment in / had surgery R         GYNECOLOGICAL HISTORY:             PREVENTIVE HEALTH MAINTENANCE             BONE DENSITY: was last done 19 with the following abnormality noted-- osteopenia     COLORECTAL CANCER SCREENING: Up to date (colonoscopy q10y; sigmoidoscopy q5y; Cologuard q3y) was last done 3-, Results are in chart; colonoscopy with  the following abnormalities noted-- severe diverticulosis; dr oneill     EYE EXAM: was last done      MAMMOGRAM: Done within last 2 years and results in are chart was last done 19 stable     PAP SMEAR: No longer indicated due to age and history         PAST MEDICAL HISTORY             CURRENT MEDICAL PROVIDERS:    Ophthalmologist: (name unknown)         Surgical History:         Biopsy of breast; benign    Cataract Removal: bilateral; 2016;     Cholecystectomy    Dilation and Curettage    Hysterectomy: rectocele repair / BSO;     COLONOSCOPY:     Positive for    VATS 10-13-20;;         Family History:     Father:  at age 32; Cause of death was MVA     Mother:  at age 61; Cause of death was CLL     Brother(s): 2 brother(s) total;  Coronary Artery Disease; Hypertension     Sister(s): 8 sister(s) total; 2 ;  Hypertension;  Colon Cancer;  COPD         Social History:     Occupation: Retired (Prior occupation: RN)     Marital Status:      Children: 2 children         Tobacco/Alcohol/Supplements:     Last Reviewed on 2020 03:35 PM by Padmini Navarro    Tobacco: She has a past history of cigarette smoking; quit date:  .          Alcohol: Frequency:    (glass of wine) a few days a week;         Substance Abuse History:     Last Reviewed on 2019 11:26 AM by Christiane Boswell        Mental Health History:     Last Reviewed on 2019 11:26 AM by Christiane Boswell        Communicable Diseases (eg STDs):     Last Reviewed on 2019 11:26 AM by Christiane Boswell        Immunizations:     influenza, high-dose, quadrivalent (FLUZONE HIGH-DOSE QUAD -) 2020    zzPrevnar-13 2017    Fluzone (3 + years dose) 2009    Pneomovax     Fluzone High-Dose pf (>=65 yr) 10/16/2013    Fluzone High-Dose pf (>=65 yr) 10/7/2015    Fluzone High-Dose pf (>=65 yr) 2016    Fluzone High-Dose pf (>=65 yr) 10/1/2018    Fluzone High-Dose pf (>=65 yr) 10/7/2019     Influenza Virus Vaccine, unspecified formulation 10/1/2017    Zostavax (Zoster live) 2/24/2010        Allergies:     Last Reviewed on 11/16/2020 03:35 PM by Padmini Navarro    Penicillins:      ACE Inhibitors:      Bactrim:          Current Medications:     Last Reviewed on 11/16/2020 03:35 PM by Padmini Navarro    Zyrtec 10 mg oral tablet [1 tab daily]    atenoloL 25 mg oral tablet [2 in the morning, 1 at night]    Caltrate 600 + D     Multivitamin/Mineral Supplement  Tablet [one a day]    aspirin 81 mg oral tablet,chewable [1 a day]    atorvastatin 10 mg oral tablet [TAKE 1 TABLET BY MOUTH EVERY OTHER DAY]    cilostazoL 100 mg oral tablet [TAKE ONE TABLET BY MOUTH TWICE A DAY]    IBUPROFEN    TAB 400MG        Objective:        Vitals:         Current: 11/16/2020 3:36:34 PM    Ht:  5 ft, 7 in;  Wt: 114.6 lbs;  BMI: 17.9T: 97.5 F (temporal);  BP: 191/83 mm Hg (right arm, sitting);  P: 73 bpm (right arm (BP Cuff), sitting);  sCr: 1.06 mg/dL;  GFR: 37.28        Repeat:     3:59:28 PM  BP:   158/67mm Hg (left arm, sitting, HR: 70)     Exams:     PHYSICAL EXAM:     GENERAL: vital signs recorded - well developed, well nourished;  no apparent distress;     EYES: PERRLA;     NECK: carotid exam reveals no bruits;     RESPIRATORY: normal respiratory rate and pattern with no distress; normal breath sounds with no rales, rhonchi, wheezes or rubs;     CARDIOVASCULAR: normal rate; rhythm is regular;  no systolic murmur; no edema;     NEUROLOGIC: GROSSLY INTACT     PSYCHIATRIC:  appropriate affect and demeanor; normal speech pattern; grossly normal memory;         Assessment:         I10   Essential (primary) hypertension           ORDERS:         Meds Prescribed:       [New Rx] hydroCHLOROthiazide 25 mg oral tablet [Take 1/2 - 1 tablet(s) by mouth daily], #30 (thirty) tablets, Refills: 1 (one)         Lab Orders:       [id number]  COMP - [facility] Comp. Metabolic Panel  (Send-Out)                      Plan:         Essential (primary)  hypertensionwill add HCTZ, CMP fine, continue beta blocker, reviewed last cardiology note     LABORATORY:  Labs ordered to be performed today include Comprehensive metabolic panel.      RECOMMENDATIONS given include: perform routine monitoring of blood pressure with home blood pressure cuff.      FOLLOW-UP: with BP readings/ call in one week           Prescriptions:       [New Rx] hydroCHLOROthiazide 25 mg oral tablet [Take 1/2 - 1 tablet(s) by mouth daily], #30 (thirty) tablets, Refills: 1 (one)           Orders:       [id number]  COMP - [facility] Comp. Metabolic Panel  (Send-Out)                  Patient Recommendations:        For  Essential (primary) hypertension:    Begin monitoring your blood pressure by brief nurse visits at our office, a home blood pressure monitor, or by checking on the machines in pharmacies or stores.  Keep a log of the readings.              Charge Capture:         Primary Diagnosis:     I10  Essential (primary) hypertension           Orders:      [id number]  Office/outpatient visit; established patient, level 3  (In-House)

## 2021-05-18 NOTE — PROGRESS NOTES
Karly Laguerre 1935     Office/Outpatient Visit    Visit Date: Tue, May 21, 2019 10:42 am    Provider: Celsa Boswell N.P. (Assistant: Kailee Mcdaniel MA)    Location: [facility]        Electronically signed by Celsa Boswell N.P. on  2019 11:25:03 AM                             SUBJECTIVE:        CC:     Ms. Laguerre is a 83 year old White female.  This is a follow-up visit.          HPI:         Dx with hypercholesterolemia; current treatment includes Lipitor.  Most recent lab tests include ALT (SGPT):  12 (U/L) (2019), Creatinine, Serum:  1.01 (mg/dl) (2019), Glom Filt Rate, Est:  52 (ml/min/1.73m2) (2019), Glucose, Serum:  100 (mg/dL) (2019), Total Cholesterol:  132 (mg/dL) (2019), HDL:  49 (mg/dL) (2019), Triglycerides:  72 (mg/dL) (2019), LDL:  69 (mg/dL) (2019).  Was started on cholesterol rx by dr ANGELICA wallace, still has leg pain         Concerning heart palpitation, takes beta blocker for her heart palpiations     ROS:     CONSTITUTIONAL:  Negative for chills, fatigue, fever, and weight change.      CARDIOVASCULAR:  Negative for chest pain.      RESPIRATORY:  Negative for cough, dyspnea, and hemoptysis.      GENITOURINARY:  Positive for urinary incontinence combined stress and urinary types.  occasionally     NEUROLOGICAL:  Negative for dizziness, headaches, paresthesias, and weakness.          PMH/FMH/SH:     Last Reviewed on 2019 10:59 AM by Celsa Boswell    Past Medical History:                 PAST MEDICAL HISTORY         retinal detachment in 1965/ had surgery R         GYNECOLOGICAL HISTORY:             PREVENTIVE HEALTH MAINTENANCE             BONE DENSITY: was last done 14 with the following abnormality noted-- osteopenia     COLORECTAL CANCER SCREENING: colonoscopy with the following abnormalities noted-- severe diverticulosis; dr oneill     MAMMOGRAM: Done within last 2 years and results in are chart was  last done 2018 stable         Surgical History:         Biopsy of breast; benign    Cataract Removal: bilateral; 2016;     Cholecystectomy    Dilation and Curettage    Hysterectomy: rectocele repair / BSO;     COLONOSCOPY:         Family History:     Father:  at age 32; Cause of death was MVA     Mother:  at age 61; Cause of death was CLL     Brother(s): 2 brother(s) total;  Coronary Artery Disease; Hypertension     Sister(s): 8 sister(s) total; 2 ;  Hypertension;  Colon Cancer;  COPD         Social History:     Occupation: Retired (Prior occupation: RN)     Marital Status:      Children: 2 children         Tobacco/Alcohol/Supplements:     Last Reviewed on 2019 10:45 AM by Kailee Mcdaniel    Tobacco: She has a past history of cigarette smoking; quit date:  .          Alcohol: Frequency: Daily (glass of wine)         Substance Abuse History:     Last Reviewed on 2019 11:26 AM by Christiane Boswell        Mental Health History:     Last Reviewed on 2019 11:26 AM by Christiane Boswell        Communicable Diseases (eg STDs):     Last Reviewed on 2019 11:26 AM by Christiane Boswell            Immunizations:     zzPrevnar-13 2017     Fluzone (3 + years dose) 2009     Pneomovax      Fluzone High-Dose pf (>=65 yr) 10/16/2013     Fluzone High-Dose pf (>=65 yr) 10/7/2015     Fluzone High-Dose pf (>=65 yr) 2016     Fluzone High-Dose pf (>=65 yr) 10/1/2018     Influenza Virus Vaccine, unspecified formulation 10/1/2017     Zostavax (Zoster live) 2010         Allergies:     Last Reviewed on 2019 10:45 AM by Kailee Mcdaniel    Penicillins:    ACE Inhibitors:    Bactrim:        Current Medications:     Last Reviewed on 2019 10:45 AM by Kailee Mcdaniel    Atenolol 25mg Tablet Take 1 tablet(s) by mouth BID     Lipitor 10mg Tablet 1 tab QOD     Multivitamin/Mineral Supplement Tablet one a day     stazol 100 mg bid     Aspirin (ASA) 81mg  Chewable Tablet 1 a day     Zyrtec 10mg Tablet 1 tab daily     Caltrate 600 + D         OBJECTIVE:        Vitals:         Current: 5/21/2019 10:48:24 AM    Ht:  5 ft, 7 in;  Wt: 124.6 lbs;  BMI: 19.5    T: 98.3 F (oral);  BP: 134/70 mm Hg (left arm, sitting);  P: 81 bpm (left arm (BP Cuff), sitting);  sCr: 1.01 mg/dL;  GFR: 41.91        Exams:     PHYSICAL EXAM:     GENERAL: vital signs recorded - well developed, well nourished;  no apparent distress;     NECK: carotid exam reveals no bruits;     RESPIRATORY: normal respiratory rate and pattern with no distress; normal breath sounds with no rales, rhonchi, wheezes or rubs;     CARDIOVASCULAR: normal rate; rhythm is regular;  no systolic murmur; no edema;     PSYCHIATRIC:  appropriate affect and demeanor; normal speech pattern; grossly normal memory;         ASSESSMENT           272.0   E78.2  Hypercholesterolemia              DDx:     785.1   R00.2  Heart palpitation              DDx:     599.72   R31.9  Microscopic hematuria              DDx:         ORDERS:         Lab Orders:       [id number]  [facility] Comp. Metabolic Panel  (Send-Out)         [id number]  [facility] Urinalysis, automated, with micro  (Send-Out)         APPTO  Appointment need  (In-House)                   PLAN:          Hypercholesterolemia takes her cholesterol rx 3 days a week /Dr Beltran has left [facility], will consider making a new referral at the follow visit (she will be due follow up in 1-2020     LABORATORY:  Labs ordered to be performed today include Comprehensive metabolic panel.      FOLLOW-UP: Schedule a follow-up visit in 6 months..   for Medicare Wellness Visit           Orders:       [id number]  [facility] Comp. Metabolic Panel  (Send-Out)         APPTO  Appointment need  (In-House)            Heart palpitation continue atenolol, will let me know when she needs refills          Microscopic hematuria reviewed previous labs     LABORATORY:  Labs ordered to be performed today include urinalysis with  micro.            Orders:       [id number]  [facility] Urinalysis, automated, with micro  (Send-Out)               Patient Recommendations:        For  Hypercholesterolemia:     Schedule a follow-up visit in 6 months.                APPOINTMENT INFORMATION:        Monday Tuesday Wednesday Thursday Friday Saturday Sunday            Time:___________________AM  PM   Date:_____________________             CHARGE CAPTURE           **Please note: ICD descriptions below are intended for billing purposes only and may not represent clinical diagnoses**        Primary Diagnosis:         272.0 Hypercholesterolemia            E78.2    Mixed hyperlipidemia              Orders:          [id number]   Office/outpatient visit; established patient, level 3  (In-House)             APPTO   Appointment need  (In-House)           785.1 Heart palpitation            R00.2    Palpitations    599.72 Microscopic hematuria            R31.9    Hematuria, unspecified

## 2021-05-18 NOTE — PROGRESS NOTES
Karly Laguerre 1935     Office/Outpatient Visit    Visit Date:  11:21 am    Provider: Celsa Boswell N.P. (Assistant: Christiane Boswell MA)    Location: [facility]        Electronically signed by Celsa Boswell N.P. on  2019 02:46:47 PM                             SUBJECTIVE:        CC:     Ms. Laguerre is a 83 year old White female.  Patient complains that Macrobid is making her sick.;         HPI:         Ms. Laguerre presents with nausea.      nausea, without vomiting This has been noted for the past 4 days.  Associated symptoms include sweating.  Medical history is significant for recently had UTI's was treated with Macrobid and she is concerned that is causing her to be nauseated.      ROS:     CONSTITUTIONAL:  Positive for fever ( low grade ).      CARDIOVASCULAR:  Negative for chest pain, palpitations, tachycardia, orthopnea, and edema.      RESPIRATORY:  Negative for cough, dyspnea, and hemoptysis.      GASTROINTESTINAL:  Positive for anorexia.   Negative for constipation or diarrhea.      GENITOURINARY:  Negative for dysuria and hematuria.      ENDOCRINE:  Positive for dry mouth.          [facility]/:     Last Reviewed on 2019 11:33 AM by Celsa Boswell    Past Medical History:                 PAST MEDICAL HISTORY         retinal detachment in 1965/ had surgery R         GYNECOLOGICAL HISTORY:             PREVENTIVE HEALTH MAINTENANCE             BONE DENSITY: was last done 14 with the following abnormality noted-- osteopenia     COLORECTAL CANCER SCREENING: colonoscopy with the following abnormalities noted-- severe diverticulosis; dr oneill     MAMMOGRAM: Done within last 2 years and results in are chart was last done 2018 stable         Surgical History:         Biopsy of breast; benign    Cataract Removal: bilateral; 2016;     Cholecystectomy    Dilation and Curettage    Hysterectomy: rectocele repair / BSO;     COLONOSCOPY:         Family  History:     Father:  at age 32; Cause of death was MVA     Mother:  at age 61; Cause of death was CLL     Brother(s): 2 brother(s) total;  Coronary Artery Disease; Hypertension     Sister(s): 8 sister(s) total; 2 ;  Hypertension;  Colon Cancer;  COPD         Social History:     Occupation: Retired (Prior occupation: RN)     Marital Status:      Children: 2 children         Tobacco/Alcohol/Supplements:     Last Reviewed on 2019 11:28 AM by Christiane Boswell    Tobacco: She has a past history of cigarette smoking; quit date:  .          Alcohol: Frequency: Daily (glass of wine)             Immunizations:     zzPrevnar-13 2017     Fluzone (3 + years dose) 2009     Pneomovax      Fluzone High-Dose pf (>=65 yr) 10/16/2013     Fluzone High-Dose pf (>=65 yr) 10/7/2015     Fluzone High-Dose pf (>=65 yr) 2016     Fluzone High-Dose pf (>=65 yr) 10/1/2018     Influenza Virus Vaccine, unspecified formulation 10/1/2017     Zostavax (Zoster live) 2010         Allergies:     Last Reviewed on 2018 02:53 PM by Salima Orellana    Penicillins:    ACE Inhibitors:    Bactrim:        Current Medications:     Last Reviewed on 2018 02:53 PM by Salima Orellana    Atenolol 25mg Tablet Take 1 tablet(s) by mouth BID     Multivitamin/Mineral Supplement Tablet one a day     Macrobid 100mg Capsules one BID x 7 days     stazol 100 mg bid     Aspirin (ASA) 81mg Chewable Tablet 1 a day     Zyrtec 10mg Tablet 1 tab daily     Caltrate 600 + D         OBJECTIVE:        Vitals:         Current: 2019 11:26:37 AM    Ht:  5 ft, 7 in;  Wt: 123.2 lbs;  BMI: 19.3    T: 98 F (oral);  BP: 127/74 mm Hg (left arm, sitting);  P: 87 bpm (right arm (BP Cuff), sitting);  sCr: 0.82 mg/dL;  GFR: 51.37        Exams:     PHYSICAL EXAM:     GENERAL: vital signs recorded - well developed, well nourished;  no apparent distress;     RESPIRATORY: normal respiratory rate and pattern with no  distress; normal breath sounds with no rales, rhonchi, wheezes or rubs;     CARDIOVASCULAR: normal rate; rhythm is regular;  no systolic murmur;     GASTROINTESTINAL: mild suprapubic tenderness;  no guarding;  no rebound tenderness;  normal bowel sounds; no organomegaly;     MUSCULOSKELETAL: No CVA tenderness     PSYCHIATRIC:  appropriate affect and demeanor; normal speech pattern; grossly normal memory;         ASSESSMENT           787.02   R11.0  Nausea              DDx:         ORDERS:         Lab Orders:       [id number]  [facility] CBC with 3 part diff  (Send-Out)         95487  COMP - [facility] Comp. Metabolic Panel  (Send-Out)         07414  [facility] - [facility] Urinalysis, automated, with micro  (Send-Out)                   PLAN:          Nausea reviewed urinalysis, cultures, okay to hold off on taking any more macrobid, will check some labs     LABORATORY:  Labs ordered to be performed today include CBC, Comprehensive metabolic panel, and urinalysis with micro.      RECOMMENDATIONS given include: get plenty of rest and continue pushing fluids.      FOLLOW-UP: after getting lab results           Orders:       [id number]  [facility] CBC with 3 part diff  (Send-Out)         52882  [facility] - [facility] Comp. Metabolic Panel  (Send-Out)         80281  [facility] - [facility] Urinalysis, automated, with micro  (Send-Out)               Patient Recommendations:        For  Nausea:     Get plenty of rest.              CHARGE CAPTURE           **Please note: ICD descriptions below are intended for billing purposes only and may not represent clinical diagnoses**        Primary Diagnosis:         787.02 Nausea            R11.0    Nausea              Orders:          [id number]   Office/outpatient visit; established patient, level 4  (In-House)

## 2021-05-18 NOTE — PROGRESS NOTES
Karly Laguerre 1935     Office/Outpatient Visit    Visit Date:  06:15 pm    Provider: Celsa Boswell N.P. (Assistant: Padmini Navarro MA)    Location: [facility]        Electronically signed by Celsa Boswell N.P. on  2018 06:45:38 PM                             SUBJECTIVE:        CC:     Ms. Laguerre is a 83 year old White female.  presents today due to complaints of left side abdominal and hip pain X 2-3 weeks after falling         HPI:         Patient complains of hip pain.      Ms. Laguerre complains of left hip pain.  The location of the pain is lateral.  The initial onset of pain was 3 weeks ago.  The apparent precipitating event was a fall.  fell when carrying food at [employer] up on steps, and landed on her bottom but also hit her left side on the rail/so having left sided rib pain and tail bone pain, but the tail bone pain is better, taking tylenol in the am and pm /the pain is worse with deep breathing         With regard to the essential hypertension, her current cardiac medication regimen includes a beta-blocker ( Tenormin ).  Ms. Laguerre does not check her blood pressure other than at her clinic appointments.  Compliance with treatment has been good; she takes her medication as directed.      ROS:     CONSTITUTIONAL:  Negative for chills, fatigue, fever, and weight change.      CARDIOVASCULAR:  Negative for chest pain, palpitations, tachycardia, orthopnea, and edema.      RESPIRATORY:  Negative for cough, dyspnea, and hemoptysis.      NEUROLOGICAL:  Negative for dizziness, headaches, paresthesias, and weakness.          PMH/FMH/SH:     Last Reviewed on 2018 06:33 PM by Celsa Boswell    Past Medical History:                 PAST MEDICAL HISTORY         retinal detachment in 1965/ had surgery R         GYNECOLOGICAL HISTORY:             PREVENTIVE HEALTH MAINTENANCE             BONE DENSITY: was last done 14 with the following abnormality noted--  osteopenia     COLORECTAL CANCER SCREENING: colonoscopy with the following abnormalities noted-- severe diverticulosis; dr oneill     MAMMOGRAM: Done within last 2 years and results in are chart was last done 2018 stable         Surgical History:         Biopsy of breast; benign    Cataract Removal: bilateral; 2016;     Cholecystectomy    Dilation and Curettage    Hysterectomy: rectocele repair / BSO;     COLONOSCOPY:         Family History:     Father:  at age 32; Cause of death was MVA     Mother:  at age 61; Cause of death was CLL     Brother(s): Coronary Artery Disease; Hypertension     Sister(s): Hypertension;  Colon Cancer;  COPD         Social History:     Occupation: Retired (Prior occupation: RN)     Marital Status:      Children: 2 children         Tobacco/Alcohol/Supplements:     Last Reviewed on 2018 06:17 PM by Padmini Navarro    Tobacco: She has a past history of cigarette smoking; quit date:  .          Alcohol: Frequency: Daily (glass of wine)             Immunizations:     zzPrevnar-13 2017     Fluzone (3 + years dose) 2009     Pneomovax      Fluzone High-Dose pf (>=65 yr) 10/16/2013     Fluzone High-Dose pf (>=65 yr) 10/7/2015     Fluzone High-Dose pf (>=65 yr) 2016     Fluzone High-Dose pf (>=65 yr) 10/1/2018     Influenza Virus Vaccine, unspecified formulation 10/1/2017     Zostavax (Zoster live) 2010         Allergies:     Last Reviewed on 2018 06:18 PM by Padmini Navarro    Penicillins:    ACE Inhibitors:    Bactrim:        Current Medications:     Last Reviewed on 2018 06:18 PM by Padmini Navarro    Atenolol 25mg Tablet Take 1 tablet(s) by mouth BID     Multivitamin/Mineral Supplement Tablet one a day     stazol 100 mg bid     Aspirin (ASA) 81mg Chewable Tablet 1 a day     Zyrtec 10mg Tablet 1 tab daily     Caltrate 600 + D         OBJECTIVE:        Vitals:         Current: 2018 6:21:11 PM    Ht:  5 ft, 7 in;  Wt:  129.6 lbs;  BMI: 20.3    T: 97.6 F (oral);  BP: 179/83 mm Hg (right arm, sitting);  P: 107 bpm (right arm (BP Cuff), sitting);  sCr: 0.74 mg/dL;  GFR: 58.16        Repeat:     6:39:37 PM     BP:   154/86mm Hg (left arm, sitting)         Exams:     PHYSICAL EXAM:     GENERAL: vital signs recorded - well developed, well nourished;  no apparent distress;     RESPIRATORY: normal respiratory rate and pattern with no distress; normal breath sounds with no rales, rhonchi, wheezes or rubs;     CARDIOVASCULAR: normal rate; rhythm is regular;  no systolic murmur; no edema;     GASTROINTESTINAL: nontender;     MUSCULOSKELETAL: full ROM of bilateral hips; pain to lateral left lower posterior ribs     PSYCHIATRIC:  appropriate affect and demeanor; normal speech pattern; grossly normal memory;         ASSESSMENT           786.50   R07.89  Rib pain              DDx:     401.1   I10  Essential hypertension              DDx:         ORDERS:         Radiology/Test Orders:       [id number]  LT Radiologic examination, ribs, unilateral inc posteroanterior chest min. 3 views  (Send-Out)           Lab Orders:       FUTURE  Future order to be done at patients convenience  (Send-Out)         [id number]  [facility] CMP AND LIPID: [id number]  (Send-Out)                   PLAN:          Rib pain broke 3 ribs on her left side 10 years ago /consider rib belt, reviewed sacrum and pelvis x-ray done last month         RADIOLOGY:  I have ordered Rib Xray Rib Xray Unilateral, Left, 2 views to be done today.            Orders:       [id number]  LT Radiologic examination, ribs, unilateral inc posteroanterior chest min. 3 views  (Send-Out)            Essential hypertension reviewed last labs, Dr ANGELICA Beltran had recommended she have her elevated cholesterol treated, she asked about lipitor low dose, to return fasting for labs, have BP rechecked         FOLLOW-UP TESTING #1: FOLLOW-UP LABORATORY:  Labs to be scheduled in the future include HTN/Lipid Panel: CMP,  Lipid.      FOLLOW-UP: recheck BP and fasting for labs           Orders:       FUTURE  Future order to be done at patients convenience  (Send-Out)         82380  HTN - [facility] CMP AND LIPID: [id number], [id number]  (Send-Out)               Patient Recommendations:        For  Essential hypertension:             The following laboratory testing has been ordered:             CHARGE CAPTURE           **Please note: ICD descriptions below are intended for billing purposes only and may not represent clinical diagnoses**        Primary Diagnosis:         786.50 Rib pain            R07.89    Other chest pain              Orders:          10992   Office/outpatient visit; established patient, level 4  (In-House)           401.1 Essential hypertension            I10    Essential (primary) hypertension

## 2021-05-28 VITALS
RESPIRATION RATE: 16 BRPM | OXYGEN SATURATION: 100 % | BODY MASS INDEX: 18.88 KG/M2 | SYSTOLIC BLOOD PRESSURE: 156 MMHG | HEART RATE: 67 BPM | HEIGHT: 66 IN | TEMPERATURE: 97.7 F | DIASTOLIC BLOOD PRESSURE: 62 MMHG | WEIGHT: 117.5 LBS

## 2021-05-28 VITALS
OXYGEN SATURATION: 94 % | WEIGHT: 114.42 LBS | SYSTOLIC BLOOD PRESSURE: 164 MMHG | WEIGHT: 126.32 LBS | HEART RATE: 58 BPM | BODY MASS INDEX: 18.47 KG/M2 | DIASTOLIC BLOOD PRESSURE: 63 MMHG | TEMPERATURE: 97 F | DIASTOLIC BLOOD PRESSURE: 75 MMHG | TEMPERATURE: 96.9 F | RESPIRATION RATE: 18 BRPM | HEART RATE: 66 BPM | RESPIRATION RATE: 16 BRPM | SYSTOLIC BLOOD PRESSURE: 163 MMHG | BODY MASS INDEX: 20.39 KG/M2 | OXYGEN SATURATION: 100 %

## 2021-05-28 NOTE — PROGRESS NOTES
Patient: ORI DENNISON     Acct: [id number]     Report: #[id number]  UNIT #: [id number]     : 1935    Encounter Date:10/20/2020  PRIMARY CARE: RAJ FRANK  ***Signed***  --------------------------------------------------------------------------------------------------------------------  Encounter Date      Oct 20, 2020      PULMONARY NODULE            History of Present Illness      This is a pleasant 85-year-old female who presents to the thoracic surgical     clinic today for routine postoperative follow-up after undergoing a left upper     lobe wedge on 2020.  She uneventful hospital stay despite a prolonged    air leak for which she was discharged with a pneumostatic.  Since discharge she     is overall doing well and has had scant output into her pneumostatic.  She     denies fever, chills, shortness of breath, dyspnea on exertion.            Procedure: Left VATS upper lobe wedge resection 2020            Pathology: Granulomatous inflammation all lymph nodes were negative for     malignancy            ALLERGY/MEDS      Allergies      Coded Allergies:             ACE INHIBITORS (Verified  Allergy, Severe, SWELLING, 10/20/20)           PENICILLINS (Verified  Allergy, Unknown, RASH, 10/20/20)           SULFA (SULFONAMIDE ANTIBIOTICS) (Verified  Allergy, Unknown, RASH,     10/20/20)           SULFAMETHOXAZOLE (Verified  Allergy, Unknown, RASH, 10/20/20)                  Replaces SULFAMETHOXAZ           TRIMETHOPRIM (Verified  Allergy, Unknown, RASH, 10/20/20)                  Replaces SULFAMETHOXAZ            Medications      Last Reconciled on 20 18:43 by RAJ MARIANO DO      Cilostazol (Pletal*) 100 Mg Tab      100 MG PO BID, #60 TAB         Reported         20       Calcium Carb/Vit D3 (CALCIUM 600-VIT D3 400 TABLET) 600 Mg Tablet      600 MG PO QDAY, #30 TAB 0 Refills         Reported         7/17/15       Multivitamins (Multi-Vitamin) 1 Tab Tablet       1 TAB PO QDAY, #30 TAB 0 Refills         Reported         7/17/15       Loratadine (Claritin) 10 Mg Tablet      10 MG PO QDAY, #30 TAB 0 Refills         Reported         7/17/15       Atenolol (ATENOLOL) 25 Mg Tablet      75 MG PO BID, #60 TAB 0 Refills         Reported         7/17/15      Medications Reviewed:  No Changes made to meds            Yes: Bladder Surgery (PELIVIC FLOOR REPAIR), Bowel Surgery (COLONOSCOPY),     Cholecystectomy, Head Surgery (RIGHT RETINAL DETACHMENT, RIGHT EYE CATARACT),     Oral Surgery (TONSILLECTOMY)      Heart - Family Hx:  Brother      Cancer/Type - Family Hx:  Mother (leukemia), Sister (colon cancer)      Other Family Medical History:  Sister (COPD)      Is Father Still Living?:  No      Is Mother Still Living?:  No      Social History:  No Tobacco Use, No Alcohol Use, No Recreational Drug use      Smoking status:  Former smoker (quit 1975, 1 ppd x 20 years)      Medical History:  No: Blood Disease, Chemotherapy/Cancer, Hemorrhoids/Rectal     Prob, Shortness Of Breath, Sinus Trouble, Miscellaneous Medical/oth            Vitals      Weight 126 lbs 5.177 oz / 57.3 kg      Temperature 97.0 F / 36.11 C - Temporal      Pulse 58      Respirations 16      Blood Pressure 164/63 Sitting, Right Arm      Pulse Oximetry 100%, ROOM AIR            General Appearance:  Alert, Oriented X3      HEENT:  Orophraynx clear      Neck:  Supple      Respiratory:  CTAB, Other (Chest tube in place without evidence of air leak)      Abdomen:  Soft, No NABS, No Masses, No Hernias, No Hepatosplenomegaly      Cardiovascular:  No Chest Tenderness; Regular Rate and Rhythm      Lymphatic:  No Neck      Extremeties:  Pulses Positive all 4 Ext      Neurological:  Mental Status WNL      Skin:  No Rash, No Cellulitis      Psychiatric:  Appropriate Affect            Imaging/Interpretation      I personally reviewed the chest x-ray which shows no evidence of significant     pneumothorax or pleural effusion with a  chest tube in place.            Ms. Laguerre presents today for follow-up after undergoing a left upper lobe wedge     resection with pathology demonstrated granulomatous inflammation.  She is doing     well and her chest tube demonstrates no evidence of air leak today it was     removed in the office and a suture was placed at the chest tube site due to the     fact that there was serous drainage of pleural effusion.  She will obtain a     chest x-ray today and we will plan to see her in 2 weeks for suture removal.  We    have encouraged her to follow-up with her pulmonologist and primary care doctors            PREVENTION      Hx Influenza Vaccination:  Yes      Date Influenza Vaccine Given:  Sep 1, 2020      Influenza Vaccine Declined:  No      2 or More Falls in Past Year?:  No      Fall Past Year with Injury?:  No      Hx Pneumococcal Vaccination:  No      Encouraged to follow-up with:  PCP regarding preventative exams.      Chart initiated by      ELOINA RINALDI            Electronically signed by ASHLIE WOODY  10/20/2020 12:11       Disclaimer: Converted document may not contain table formatting or lab diagrams. Please see [employer] for the authenticated document.

## 2021-05-28 NOTE — PROGRESS NOTES
Patient: ORI LAGUERRE     Acct: [id number]     Report: #[id number]  UNIT #: [id number]     : 1935    Encounter Date:2020  PRIMARY CARE: RAJ FRANK  ***Signed***  --------------------------------------------------------------------------------------------------------------------  Chief Complaint      Encounter Date      Sep 22, 2020            Primary Care Provider      RAJ FRANK            Referring Provider            cancer center            Patient Complaint      Patient is complaining of      Pt here for f/u and results, Lung nodule            VITALS      Height 5 ft 6 in / 167.64 cm      Weight 117 lbs 8 oz / 53.266337 kg      BSA 1.60 m2      BMI 19.0 kg/m2      Temperature 97.7 F / 36.5 C - Temporal      Pulse 67      Respirations 16      Blood Pressure 156/62 Sitting, Right Arm      Pulse Oximetry 100%, Room air            HPI      Ms. Laguerre was scheduled today to review her PFTs, stress test report ordered by     Dr. Worley.  These tests were ordered for preoperative testing prior to     undergoing VATS with segmentectomy by Dr. Interiano.  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  Her test results were already reviewed with    her by Dr. Interiano on 9/15/2020.  The patient has no complaints today.  She has     yet to hear from Dr. Interiano's office regarding the date of her surgery to be     scheduled and is requesting our office to reach out to his office to clarify     this surgical date.            ROS      Constitutional:  Denies: Fatigue, Fever, Weight gain, Weight loss, Chills,     Insomnia, Other      Respiratory/Breathing:  Denies: Shortness of air, Wheezing, Cough, Hemoptysis,     Pleuritic pain, Other      Endocrine:  Denies: Polydipsia, Polyuria, Heat/cold intolerance, Abnorml     menstrual pattern, Diabetes, Other      Eyes:  Denies: Blurred vision, Vision  Changes, Other      Ears, nose, mouth, throat:  Denies: Mouth lesions, Thrush, Throat pain,     Hoarseness, Allergies/Hay Fever, Post Nasal Drip, Headaches, Recent Head Injury,    Nose Bleeding, Neck Stiffness, Thyroid Mass, Hearing Loss, Ear Fullness, Dry     Mouth, Nasal or Sinus Pain, Dry Lips, Nasal discharge, Nasal congestion, Other      Cardiovascular:  Denies: Palpitations, Syncope, Claudication, Chest Pain, Wake     up Gasping for air, Leg Swelling, Irregular Heart Rate, Cyanosis, Dyspnea on     Exertion, Other      Gastrointestinal:  Denies: Nausea, Constipation, Diarrhea, Abdominal pain,     Vomiting, Difficulty Swallowing, Reflux/Heartburn, Dysphagia, Jaundice,     Bloating, Melena, Bloody stools, Other      Genitourinary:  Denies: Urinary frequency, Incontinence, Hematuria, Urgency,     Nocturia, Dysuria, Testicular problems, Other      Musculoskeletal:  Denies: Joint Pain, Joint Stiffness, Joint Swelling, Myalgias,    Other      Hematologic/lymphatic:  DENIES: Lymphadenopathy, Bruising, Bleeding tendencies,     Other      Neurological:  Denies: Headache, Numbness, Weakness, Seizures, Other      Psychiatric:  Denies: Anxiety, Appropriate Effect, Depression, Other      Sleep:  No: Excessive daytime sleep, Morning Headache?, Snoring, Insomnia?, Stop    breathing at sleep?, Other      Integumentary:  Denies: Rash, Dry skin, Skin Warm to Touch, Other      Immunologic/Allergic:  Complains of: Seasonal allergies; Denies: Latex allergy,     Asthma, Urticaria, Eczema, Other      Immunization status:  No: Up to date            FAMILY/SOCIAL/MEDICAL HX      Surgical History:  Yes: Bladder Surgery (PELIVIC FLOOR REPAIR), Bowel Surgery     (COLONOSCOPY), Cholecystectomy, Head Surgery (RIGHT RETINAL DETACHMENT, RIGHT     EYE CATARACT), Oral Surgery (TONSILLECTOMY)      Heart - Family Hx:  Brother      Cancer/Type - Family Hx:  Mother (leukemia), Sister (colon cancer)      Other Family Medical History:  Sister (COPD)       Is Father Still Living?:  No      Is Mother Still Living?:  No       Family History:  Yes      Social History:  No Tobacco Use, No Alcohol Use, No Recreational Drug use      Smoking status:  Former smoker (quit 1975, 1 ppd x 20 years)      Currently Vaping:  No      Anticoagulation Therapy:  No      Antibiotic Prophylaxis:  No      Medical History:  No: Blood Disease, Chemotherapy/Cancer, Hemorrhoids/Rectal     Prob, Shortness Of Breath, Sinus Trouble, Miscellaneous Medical/oth      Psychiatric History      None            PREVENTION      Hx Influenza Vaccination:  Yes      Date Influenza Vaccine Given:  Sep 1, 2020      Influenza Vaccine Declined:  No      2 or More Falls in Past Year?:  No      Fall Past Year with Injury?:  No      Hx Pneumococcal Vaccination:  No      Encouraged to follow-up with:  PCP regarding preventative exams.      Chart initiated by      Naomi Lo MA            ALLERGIES/MEDICATIONS      Allergies:        Coded Allergies:             ACE INHIBITORS (Verified  Allergy, Severe, SWELLING, 9/22/20)           PENICILLINS (Verified  Allergy, Unknown, RASH, 9/22/20)           SULFA (SULFONAMIDE ANTIBIOTICS) (Verified  Allergy, Unknown, RASH, 9/22/20)           SULFAMETHOXAZOLE (Verified  Allergy, Unknown, RASH, 9/22/20)                  Replaces SULFAMETHOXAZ           TRIMETHOPRIM (Verified  Allergy, Unknown, RASH, 9/22/20)                  Replaces SULFAMETHOXAZ      Medications    Last Reconciled on 9/22/20 18:43 by RAJ MARIANO,       Cilostazol (Pletal*) 100 Mg Tab      100 MG PO BID, #60 TAB         Reported         9/1/20       Calcium Carb/Vit D3 (CALCIUM 600-VIT D3 400 TABLET) 600 Mg Tablet      600 MG PO QDAY, #30 TAB 0 Refills         Reported         7/17/15       Multivitamins (Multi-Vitamin) 1 Tab Tablet      1 TAB PO QDAY, #30 TAB 0 Refills         Reported         7/17/15       Loratadine (Claritin) 10 Mg Tablet      10 MG PO QDAY, #30 TAB 0 Refills         Reported          7/17/15       Atenolol (ATENOLOL) 25 Mg Tablet      75 MG PO BID, #60 TAB 0 Refills         Reported         7/17/15      Current Medications      Current Medications Reviewed 9/22/20            EXAM      Vtials      Vitals:             Height 5 ft 6 in / 167.64 cm           Weight 117 lbs 8 oz / 53.380076 kg           BSA 1.60 m2           BMI 19.0 kg/m2           Temperature 97.7 F / 36.5 C - Temporal           Pulse 67           Respirations 16           Blood Pressure 156/62 Sitting, Right Arm           Pulse Oximetry 100%, Room air            REVIEW      Results Reviewed      Radiographic Results      I reviewed her PET scan            Reviewed stress echo which showed no reversible ischemia; conclusion below             CONCLUSION:      The EKG part of the test is positive for ischemia.  However, stress      echocardiogram did not show any regional wall motion abnormalities,      indicating absence of ischemic changes by echocardiographic criteria.  The      patient did not have any chest pain during exercise.  Clinical correlation      recommended.      PFT Results      Reviewed PFTs interpreted by Dr. Worley.      FEV1 97% of predicted 2.06 L      Diffusion capacity 56% of predicted            Assessment      IMPRESSION:      1.  Stage I adenocarcinoma      2. 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       3. Remote tobacco abuse of cigarettes.             PLAN:      1.  The patient is scheduled to have VATS with segmentectomy with Dr. Interiano in     the near future.  We have reached out to his clinical coordinator, Pablito but was    unable to reach her by phone today.  I gave the patient Dr. Interiano's office     number to try to get the date of her upcoming surgery scheduled.      2.  The patient is on Pletal and this will need to be held prior to procedure;     will defer this to Dr. Interiano      3.  I have reviewed the PFTs, stress echo with the  patient today and she had no     questions.      4.  Congratulated the patient on her smoking cessation.      5.  The patient refused flu vaccine today            Patient Education      Patient Education Provided:  Lung Cancer            Electronically signed by RAJ MARIANO  09/22/2020 18:43       Disclaimer: Converted document may not contain table formatting or lab diagrams. Please see [employer] for the authenticated document.

## 2021-05-28 NOTE — PROGRESS NOTES
Patient: ORI DENNISON     Acct: [id number]     Report: #[id number]  UNIT #: [id number]     : 1935    Encounter Date:2020  PRIMARY CARE: RAJ FRANK  ***Signed***  --------------------------------------------------------------------------------------------------------------------  Encounter Date      Nov 3, 2020      PULMONARY NODULE            History of Present Illness      This is a pleasant 85-year-old female who presents today for continued follow-up    at the thoracic surgical clinic today for suture removal after undergoing a left    VATS upper lobe wedge resection on 2024 granulomatous inflammation.      Since we last saw her she is overall doing well she denies fever, chills,     shortness of breath, dyspnea on exertion.  She states that since her last office    visit she has not experienced any drainage from her previous chest tube site.            ALLERGY/MEDS      Allergies      Coded Allergies:             ACE INHIBITORS (Verified  Allergy, Severe, SWELLING, 10/20/20)           PENICILLINS (Verified  Allergy, Unknown, RASH, 10/20/20)           SULFA (SULFONAMIDE ANTIBIOTICS) (Verified  Allergy, Unknown, RASH,     10/20/20)           SULFAMETHOXAZOLE (Verified  Allergy, Unknown, RASH, 10/20/20)                  Replaces SULFAMETHOXAZ           TRIMETHOPRIM (Verified  Allergy, Unknown, RASH, 10/20/20)                  Replaces SULFAMETHOXAZ            Medications      Last Reconciled on 20 18:43 by RAJ MARIANO,       Cilostazol (Pletal*) 100 Mg Tab      100 MG PO BID, #60 TAB         Reported         20       Calcium Carb/Vit D3 (CALCIUM 600-VIT D3 400 TABLET) 600 Mg Tablet      600 MG PO QDAY, #30 TAB 0 Refills         Reported         7/17/15       Multivitamins (Multi-Vitamin) 1 Tab Tablet      1 TAB PO QDAY, #30 TAB 0 Refills         Reported         7/17/15       Loratadine (Claritin) 10 Mg Tablet      10 MG PO QDAY, #30 TAB 0 Refills          Reported         7/17/15       Atenolol (ATENOLOL) 25 Mg Tablet      75 MG PO BID, #60 TAB 0 Refills         Reported         7/17/15      Medications Reviewed:  No Changes made to meds            Yes: Bladder Surgery (PELIVIC FLOOR REPAIR), Bowel Surgery (COLONOSCOPY),     Cholecystectomy, Head Surgery (RIGHT RETINAL DETACHMENT, RIGHT EYE CATARACT),     Oral Surgery (TONSILLECTOMY)      Heart - Family Hx:  Brother      Cancer/Type - Family Hx:  Mother (leukemia), Sister (colon cancer)      Other Family Medical History:  Sister (COPD)      Is Father Still Living?:  No      Is Mother Still Living?:  No      Social History:  No Tobacco Use, No Alcohol Use, No Recreational Drug use      Smoking status:  Former smoker (quit 1975, 1 ppd x 20 years)      Medical History:  No: Blood Disease, Chemotherapy/Cancer, Hemorrhoids/Rectal     Prob, Shortness Of Breath, Sinus Trouble, Miscellaneous Medical/oth            Vitals      Weight 114 lbs 6.700 oz / 51.9 kg      Temperature 96.9 F / 36.06 C - Temporal      Pulse 66      Respirations 18      Blood Pressure 163/75 Sitting, Left Arm      Pulse Oximetry 94%, ROOM AIR            General Appearance:  Alert, Oriented X3      HEENT:  Orophraynx clear, No Erythema, No Exudates      Neck:  Supple      Respiratory:  CTAB, Other (Incisions well-healed)      Abdomen:  Soft, No NABS, No Masses, No Hernias, No Hepatosplenomegaly      Cardiovascular:  No Chest Tenderness      Lymphatic:  No Neck      Extremeties:  Pulses Positive all 4 Ext      Neurological:  Mental Status WNL      Skin:  No Rash, No Cellulitis      Psychiatric:  Appropriate Affect            Ms. Laguerre is doing well we are pleased with her progress we remove the suture     from her chest tube site today.  We reviewed her pathology which was consistent     with a benign process.  We will plan to see her on an as-needed basis            PREVENTION      Hx Influenza Vaccination:  Yes      Date Influenza Vaccine Given:  Sep  1, 2020      Influenza Vaccine Declined:  No      2 or More Falls in Past Year?:  No      Fall Past Year with Injury?:  No      Hx Pneumococcal Vaccination:  No      Encouraged to follow-up with:  PCP regarding preventative exams.      Chart initiated by      ELOINA RINALDI            Electronically signed by ASHLIE WOODY  11/03/2020 11:04       Disclaimer: Converted document may not contain table formatting or lab diagrams. Please see [employer] for the authenticated document.

## 2021-06-09 ENCOUNTER — LAB (OUTPATIENT)
Dept: LAB | Facility: HOSPITAL | Age: 86
End: 2021-06-09

## 2021-06-09 ENCOUNTER — OFFICE VISIT (OUTPATIENT)
Dept: FAMILY MEDICINE CLINIC | Age: 86
End: 2021-06-09

## 2021-06-09 VITALS
SYSTOLIC BLOOD PRESSURE: 138 MMHG | TEMPERATURE: 97.4 F | BODY MASS INDEX: 15.47 KG/M2 | HEART RATE: 67 BPM | DIASTOLIC BLOOD PRESSURE: 66 MMHG | WEIGHT: 98.8 LBS

## 2021-06-09 DIAGNOSIS — C20 RECTAL CANCER (HCC): ICD-10-CM

## 2021-06-09 DIAGNOSIS — R41.3 MEMORY LOSS OR IMPAIRMENT: Primary | ICD-10-CM

## 2021-06-09 DIAGNOSIS — R41.3 MEMORY LOSS OR IMPAIRMENT: ICD-10-CM

## 2021-06-09 PROBLEM — I73.9 INTERMITTENT CLAUDICATION: Status: ACTIVE | Noted: 2021-04-13

## 2021-06-09 PROBLEM — I48.0 PAROXYSMAL ATRIAL FIBRILLATION (HCC): Status: ACTIVE | Noted: 2021-04-13

## 2021-06-09 PROBLEM — E78.00 HYPERCHOLESTEROLEMIA: Status: ACTIVE | Noted: 2021-06-09

## 2021-06-09 LAB
BASOPHILS # BLD AUTO: 0.02 10*3/MM3 (ref 0–0.2)
BASOPHILS NFR BLD AUTO: 0.2 % (ref 0–1.5)
DEPRECATED RDW RBC AUTO: 49.4 FL (ref 37–54)
EOSINOPHIL # BLD AUTO: 0.07 10*3/MM3 (ref 0–0.4)
EOSINOPHIL NFR BLD AUTO: 0.8 % (ref 0.3–6.2)
ERYTHROCYTE [DISTWIDTH] IN BLOOD BY AUTOMATED COUNT: 14.6 % (ref 12.3–15.4)
HCT VFR BLD AUTO: 41.6 % (ref 34–46.6)
HGB BLD-MCNC: 13.9 G/DL (ref 12–15.9)
IMM GRANULOCYTES # BLD AUTO: 0.01 10*3/MM3 (ref 0–0.05)
IMM GRANULOCYTES NFR BLD AUTO: 0.1 % (ref 0–0.5)
LYMPHOCYTES # BLD AUTO: 1.64 10*3/MM3 (ref 0.7–3.1)
LYMPHOCYTES NFR BLD AUTO: 18.8 % (ref 19.6–45.3)
MCH RBC QN AUTO: 30.4 PG (ref 26.6–33)
MCHC RBC AUTO-ENTMCNC: 33.4 G/DL (ref 31.5–35.7)
MCV RBC AUTO: 91 FL (ref 79–97)
MONOCYTES # BLD AUTO: 0.61 10*3/MM3 (ref 0.1–0.9)
MONOCYTES NFR BLD AUTO: 7 % (ref 5–12)
NEUTROPHILS NFR BLD AUTO: 6.38 10*3/MM3 (ref 1.7–7)
NEUTROPHILS NFR BLD AUTO: 73.1 % (ref 42.7–76)
PLATELET # BLD AUTO: 189 10*3/MM3 (ref 140–450)
PMV BLD AUTO: 9.5 FL (ref 6–12)
RBC # BLD AUTO: 4.57 10*6/MM3 (ref 3.77–5.28)
VIT B12 BLD-MCNC: 798 PG/ML (ref 211–946)
WBC # BLD AUTO: 8.73 10*3/MM3 (ref 3.4–10.8)

## 2021-06-09 PROCEDURE — 86592 SYPHILIS TEST NON-TREP QUAL: CPT

## 2021-06-09 PROCEDURE — 80053 COMPREHEN METABOLIC PANEL: CPT

## 2021-06-09 PROCEDURE — 82607 VITAMIN B-12: CPT

## 2021-06-09 PROCEDURE — 36415 COLL VENOUS BLD VENIPUNCTURE: CPT

## 2021-06-09 PROCEDURE — 99214 OFFICE O/P EST MOD 30 MIN: CPT | Performed by: NURSE PRACTITIONER

## 2021-06-09 PROCEDURE — 85025 COMPLETE CBC W/AUTO DIFF WBC: CPT

## 2021-06-09 PROCEDURE — 84443 ASSAY THYROID STIM HORMONE: CPT

## 2021-06-09 RX ORDER — METOPROLOL TARTRATE 100 MG/1
100 TABLET ORAL 2 TIMES DAILY
COMMUNITY
End: 2022-10-25

## 2021-06-09 RX ORDER — ONDANSETRON 4 MG/1
4 TABLET, ORALLY DISINTEGRATING ORAL AS NEEDED
COMMUNITY
Start: 2021-04-26

## 2021-06-09 NOTE — ASSESSMENT & PLAN NOTE
Scheduling a follow up appt with her colo rectal surgeon, advised her sister to get that appt scheduled.

## 2021-06-09 NOTE — ASSESSMENT & PLAN NOTE
Patient denied memory loss but her sister reported some of her answers as being incorrect.  Will send her over for labs, consider brain scan and neurology consult.  Discussed and advised her not to drive.  Consider sending her to [facility] for [facility] evaluation.    Kecia her sister did ask to call her or Cyril her son due to her living with him and her needing to drive her for any appt's.    Kecia [phone]  Cyril [phone]

## 2021-06-09 NOTE — PROGRESS NOTES
Karly Laguerre presents to [facility].    Chief Complaint:  Follow-up (Pt sister Kecia is here with her today, stating that pt is having some confusion especially since after having two in the last six months, pt sister states the confusion is worse at night)         History of Present Illness:  Here with her sister for follow up.  There is family concern of memory loss.  There was concern that has increased since having two surgeries.  The first last fall related to lung nodule and then on 3-30-21 she had colo rectal surgery for cancer and now has an ileostomy.  She is living with one of her sons due to her need for assistance that her  was not able to provide.  Her sister is also providing some care and reports that she drives here where she needs to go.  A brother that is younger has dementia.   There is hope for her to have a take down of her ostomy.  She does empty her ostomy bag.  She does have a cardiologist.  She developed A fib after her colon surgery.        Review of Systems:  Review of Systems   Constitutional: Negative for fatigue and fever.        Concern for her weight loss, but she feels like that is stable.    Respiratory: Negative for cough and shortness of breath.    Cardiovascular: Positive for leg swelling. Negative for chest pain and palpitations.        Mild in her feet at times    Gastrointestinal: Negative for nausea.        Negative for anorexia   Neurological: Negative for numbness.        Medical History:  Past Medical History:   • Abnormal weight loss   • Chest pain, unspecified   • Colon cancer (CMS/HCC)    invasive colonic adencarcinoma sigmoid colon tumor   • Essential (primary) hypertension   • Heart palpitations   • Hypercholesterolemia   • Joint pain   • Microscopic hematuria   • Mixed hyperlipidemia   • Osteopenia   • Other fatigue   • Other fecal abnormalities   • Other nonspecific abnormal finding of lung field   • Other specified  disorders of bone density and structure, unspecified site   • Personal history of other diseases of circulatory system   • Postmenopausal atrophic vaginitis   • Radiculopathy, sacral and sacrococcygeal region   • Retinal detachment    had surgery   • Sacral pain   • Unspecified atrial fibrillation (CMS/HCC)   • Urinary, incontinence, stress female   • UTI (urinary tract infection)   • Varicose vein of leg     Past Surgical History:   • BILATERAL VATS ABLATION    benign   • BREAST BIOPSY    benign   • CATARACT EXTRACTION, BILATERAL   • CHOLECYSTECTOMY   • COLON RESECTION WITH ILEOSTOMY    rectal cancer: robotic surgery. flex sig   • COLONOSCOPY   • DILATION AND CURETTAGE, DIAGNOSTIC / THERAPEUTIC   • HYSTERECTOMY    rectocele repair/bso   • SIGMOIDOSCOPY    rectal polyp malignant      Family History   Problem Relation Age of Onset   • Leukemia Mother         chronic lymphocytic leukemia   • Hypertension Sister    • COPD Sister    • Colon cancer Sister    • Coronary artery disease Brother    • Hypertension Brother    • Coronary artery disease Brother    • Hypertension Brother    • Hypertension Sister    • COPD Sister    • Colon cancer Sister      Social History     Tobacco Use   • Smoking status: Former Smoker     Types: Cigarettes     Quit date:      Years since quittin.4   Substance Use Topics   • Alcohol use: Yes     Comment: a few days a week       Health Maintenance Due   Topic Date Due   • TDAP/TD VACCINES (1 - Tdap) Never done   • ZOSTER VACCINE (2 of 3) 2020   • ANNUAL WELLNESS VISIT  2021        Immunization History   Administered Date(s) Administered   • COVID-19 (PFIZER) 03/10/2021, 2021   • Fluzone High Dose =>65 Years ([employer] ONLY) 10/16/2013, 2017, 10/07/2019   • Influenza, Unspecified 10/01/2017, 2020   • Pneumococcal Conjugate 13-Valent (PCV13) 2017   • Pneumococcal Polysaccharide (PPSV23) 2003   • Zostavax 2010   • Zoster, Unspecified  11/25/2019       Allergies   Allergen Reactions   • Penicillins Rash   • Ace Inhibitors Swelling   • Sulfamethoxazole-Trimethoprim Rash        Medications:  Current Outpatient Medications on File Prior to Visit   Medication Sig   • apixaban (Eliquis) 2.5 MG tablet tablet Take 2.5 mg by mouth 2 (two) times a day.   • dilTIAZem (CARDIZEM) 30 MG tablet Take 30 mg by mouth 3 (Three) Times a Day.   • metoprolol tartrate (LOPRESSOR) 100 MG tablet Take 100 mg by mouth 2 (Two) Times a Day.   • ondansetron ODT (ZOFRAN-ODT) 4 MG disintegrating tablet Place 4 mg under the tongue As Needed.     No current facility-administered medications on file prior to visit.       Vital Signs:   /66 (BP Location: Right arm, Patient Position: Sitting, Cuff Size: Pediatric)   Pulse 67   Temp 97.4 °F (36.3 °C) (Oral)   Wt 44.8 kg (98 lb 12.8 oz)   BMI 15.47 kg/m²       Physical Exam:  Physical Exam  Vitals reviewed.   Constitutional:       General: She is not in acute distress.     Appearance: Normal appearance.   Neck:      Vascular: No carotid bruit.   Cardiovascular:      Rate and Rhythm: Normal rate and regular rhythm.      Heart sounds: No murmur heard.     Pulmonary:      Effort: Pulmonary effort is normal. No respiratory distress.      Breath sounds: Normal breath sounds.   Musculoskeletal:      Right lower leg: No edema.      Left lower leg: No edema.   Neurological:      Mental Status: She is alert.   Psychiatric:      Comments: Pleasant with mild confusion          Result Review      The following data was reviewed by: PAULIE Barajas on 06/09/2021:reviewed labs from 1-6-2021 PET skull to mid thigh 8-26-20 with no abnormal metabolic activity in head. Reviewed weight, she is down 15 pounds from 1-2021    No results found for: HGBA1C            Assessment and Plan:          Diagnoses and all orders for this visit:    1. Memory loss or impairment (Primary)  Assessment & Plan:  Patient denied memory loss but her  sister reported some of her answers as being incorrect.  Will send her over for labs, consider brain scan and neurology consult.  Discussed and advised her not to drive.  Consider sending her to [facility] for [facility] evaluation.    Kecia her sister did ask to call her or Cyril her son due to her living with him and her needing to drive her for any appt's.    Kecia [phone]  Cyril [phone]    Orders:  -     CBC & Differential; Future  -     Comprehensive Metabolic Panel; Future  -     TSH; Future  -     RPR; Future  -     Vitamin B12; Future    2. Rectal cancer (CMS/HCC)  Assessment & Plan:  Scheduling a follow up appt with her colo rectal surgeon, advised her sister to get that appt scheduled.          Follow Up   Return for pending lab results .  Patient was given instructions and counseling regarding her condition or for health maintenance advice. Please see specific information pulled into the AVS if appropriate.

## 2021-06-10 LAB
ALBUMIN SERPL-MCNC: 4.2 G/DL (ref 3.5–5.2)
ALBUMIN/GLOB SERPL: 1.5 G/DL
ALP SERPL-CCNC: 75 U/L (ref 39–117)
ALT SERPL W P-5'-P-CCNC: 58 U/L (ref 1–33)
ANION GAP SERPL CALCULATED.3IONS-SCNC: 8.2 MMOL/L (ref 5–15)
AST SERPL-CCNC: 47 U/L (ref 1–32)
BILIRUB SERPL-MCNC: 0.2 MG/DL (ref 0–1.2)
BUN SERPL-MCNC: 23 MG/DL (ref 8–23)
BUN/CREAT SERPL: 25.8 (ref 7–25)
CALCIUM SPEC-SCNC: 9 MG/DL (ref 8.6–10.5)
CHLORIDE SERPL-SCNC: 106 MMOL/L (ref 98–107)
CO2 SERPL-SCNC: 21.8 MMOL/L (ref 22–29)
CREAT SERPL-MCNC: 0.89 MG/DL (ref 0.57–1)
GFR SERPL CREATININE-BSD FRML MDRD: 61 ML/MIN/1.73
GLOBULIN UR ELPH-MCNC: 2.8 GM/DL
GLUCOSE SERPL-MCNC: 119 MG/DL (ref 65–99)
POTASSIUM SERPL-SCNC: 3.6 MMOL/L (ref 3.5–5.2)
PROT SERPL-MCNC: 7 G/DL (ref 6–8.5)
RPR SER QL: NORMAL
SODIUM SERPL-SCNC: 136 MMOL/L (ref 136–145)
TSH SERPL DL<=0.05 MIU/L-ACNC: 1.24 UIU/ML (ref 0.27–4.2)

## 2021-06-14 ENCOUNTER — TELEPHONE (OUTPATIENT)
Dept: FAMILY MEDICINE CLINIC | Age: 86
End: 2021-06-14

## 2021-06-14 DIAGNOSIS — R41.3 MEMORY LOSS OR IMPAIRMENT: Primary | ICD-10-CM

## 2021-06-14 DIAGNOSIS — R79.89 ABNORMAL LIVER FUNCTION TEST: ICD-10-CM

## 2021-06-14 NOTE — TELEPHONE ENCOUNTER
----- Message from PAULIE Barajas sent at 6/12/2021  1:55 PM EDT -----  Please call the patient regarding her abnormal result. Her LFT's are up, otherwise labs are okay, will need to repeat LFT's in 2 months, I would like to refer her to [facility] neurologist for evaluation of memory issues/loss.  Her sister is to be contacted with the information or her son, she is currently staying with him but her sister is the one who will be taking her to appt.

## 2021-06-14 NOTE — TELEPHONE ENCOUNTER
Caller: Karly Laguerre    Relationship: Self    Best call back number: [phone]     What is the best time to reach you: ANY    Who are you requesting to speak with (clinical staff, provider,  specific staff member): CLINICAL STAFF    What was the call regarding: PATIENT CALLED BACK AND WOULD LIKE TO SPEAK WITH A NURSE.    UNABLE TO WARM TRANSFER    Do you require a callback: YES

## 2021-06-15 NOTE — TELEPHONE ENCOUNTER
Talked to son Domingo, he said ok for referral and repeat labs. Orders pending provider signature.

## 2021-06-22 ENCOUNTER — TELEPHONE (OUTPATIENT)
Dept: FAMILY MEDICINE CLINIC | Age: 86
End: 2021-06-22

## 2021-06-22 NOTE — TELEPHONE ENCOUNTER
Caller: SIL TSANG    Relationship: Emergency Contact    Best call back number: [phone]\    What was the call regarding: SISTER SAID RAJ FRANK WAS GOING TO SET UP REFERRAL FOR NEUROLOGY. PLEASE ADVISE       Do you require a callback: YES

## 2021-07-01 VITALS
SYSTOLIC BLOOD PRESSURE: 127 MMHG | WEIGHT: 123.2 LBS | HEIGHT: 67 IN | HEART RATE: 87 BPM | TEMPERATURE: 98 F | DIASTOLIC BLOOD PRESSURE: 74 MMHG | BODY MASS INDEX: 19.34 KG/M2

## 2021-07-01 VITALS
HEIGHT: 67 IN | BODY MASS INDEX: 19.56 KG/M2 | DIASTOLIC BLOOD PRESSURE: 70 MMHG | SYSTOLIC BLOOD PRESSURE: 134 MMHG | TEMPERATURE: 98.3 F | HEART RATE: 81 BPM | WEIGHT: 124.6 LBS

## 2021-07-01 VITALS
DIASTOLIC BLOOD PRESSURE: 54 MMHG | TEMPERATURE: 97.7 F | WEIGHT: 130.2 LBS | HEART RATE: 66 BPM | BODY MASS INDEX: 20.44 KG/M2 | SYSTOLIC BLOOD PRESSURE: 133 MMHG | HEIGHT: 67 IN

## 2021-07-01 VITALS
TEMPERATURE: 98.1 F | HEART RATE: 98 BPM | DIASTOLIC BLOOD PRESSURE: 66 MMHG | HEIGHT: 67 IN | BODY MASS INDEX: 19.93 KG/M2 | SYSTOLIC BLOOD PRESSURE: 124 MMHG | WEIGHT: 127 LBS

## 2021-07-01 VITALS
DIASTOLIC BLOOD PRESSURE: 60 MMHG | TEMPERATURE: 97.6 F | SYSTOLIC BLOOD PRESSURE: 133 MMHG | HEART RATE: 79 BPM | BODY MASS INDEX: 18.96 KG/M2 | HEIGHT: 67 IN | WEIGHT: 120.8 LBS | OXYGEN SATURATION: 100 %

## 2021-07-01 VITALS
TEMPERATURE: 97.6 F | WEIGHT: 129.6 LBS | SYSTOLIC BLOOD PRESSURE: 154 MMHG | BODY MASS INDEX: 20.34 KG/M2 | HEART RATE: 107 BPM | HEIGHT: 67 IN | DIASTOLIC BLOOD PRESSURE: 86 MMHG

## 2021-07-01 VITALS
HEART RATE: 70 BPM | TEMPERATURE: 97 F | DIASTOLIC BLOOD PRESSURE: 50 MMHG | BODY MASS INDEX: 19.18 KG/M2 | HEIGHT: 67 IN | WEIGHT: 122.2 LBS | SYSTOLIC BLOOD PRESSURE: 124 MMHG

## 2021-07-01 VITALS
HEART RATE: 66 BPM | WEIGHT: 121.7 LBS | SYSTOLIC BLOOD PRESSURE: 128 MMHG | TEMPERATURE: 96.6 F | DIASTOLIC BLOOD PRESSURE: 53 MMHG | BODY MASS INDEX: 19.1 KG/M2 | HEIGHT: 67 IN

## 2021-07-01 VITALS
HEIGHT: 67 IN | BODY MASS INDEX: 19.21 KG/M2 | DIASTOLIC BLOOD PRESSURE: 58 MMHG | SYSTOLIC BLOOD PRESSURE: 122 MMHG | WEIGHT: 122.4 LBS | HEART RATE: 84 BPM

## 2021-07-02 VITALS
BODY MASS INDEX: 19.37 KG/M2 | TEMPERATURE: 97.2 F | HEIGHT: 67 IN | DIASTOLIC BLOOD PRESSURE: 58 MMHG | SYSTOLIC BLOOD PRESSURE: 127 MMHG | HEART RATE: 78 BPM | WEIGHT: 123.4 LBS

## 2021-07-02 VITALS
DIASTOLIC BLOOD PRESSURE: 66 MMHG | BODY MASS INDEX: 17.83 KG/M2 | TEMPERATURE: 96.2 F | HEART RATE: 72 BPM | SYSTOLIC BLOOD PRESSURE: 138 MMHG | WEIGHT: 113.6 LBS | HEIGHT: 67 IN

## 2021-07-02 VITALS
WEIGHT: 114.6 LBS | SYSTOLIC BLOOD PRESSURE: 158 MMHG | TEMPERATURE: 97.5 F | BODY MASS INDEX: 17.99 KG/M2 | DIASTOLIC BLOOD PRESSURE: 67 MMHG | HEART RATE: 73 BPM | HEIGHT: 67 IN

## 2021-07-02 VITALS
TEMPERATURE: 97.5 F | BODY MASS INDEX: 19.27 KG/M2 | HEIGHT: 67 IN | SYSTOLIC BLOOD PRESSURE: 143 MMHG | DIASTOLIC BLOOD PRESSURE: 75 MMHG | HEART RATE: 67 BPM | WEIGHT: 122.8 LBS

## 2021-07-02 VITALS
BODY MASS INDEX: 18.99 KG/M2 | SYSTOLIC BLOOD PRESSURE: 146 MMHG | TEMPERATURE: 97.6 F | WEIGHT: 121 LBS | HEIGHT: 67 IN | DIASTOLIC BLOOD PRESSURE: 72 MMHG | HEART RATE: 61 BPM

## 2021-10-08 ENCOUNTER — LAB (OUTPATIENT)
Dept: LAB | Facility: HOSPITAL | Age: 86
End: 2021-10-08

## 2021-10-08 DIAGNOSIS — R79.89 ABNORMAL LIVER FUNCTION TEST: ICD-10-CM

## 2021-10-08 LAB
ALBUMIN SERPL-MCNC: 4.3 G/DL (ref 3.5–5.2)
ALBUMIN/GLOB SERPL: 1.5 G/DL
ALP SERPL-CCNC: 81 U/L (ref 39–117)
ALT SERPL W P-5'-P-CCNC: 34 U/L (ref 1–33)
ANION GAP SERPL CALCULATED.3IONS-SCNC: 9.8 MMOL/L (ref 5–15)
AST SERPL-CCNC: 35 U/L (ref 1–32)
BILIRUB SERPL-MCNC: 0.2 MG/DL (ref 0–1.2)
BUN SERPL-MCNC: 28 MG/DL (ref 8–23)
BUN/CREAT SERPL: 21.2 (ref 7–25)
CALCIUM SPEC-SCNC: 9.1 MG/DL (ref 8.6–10.5)
CHLORIDE SERPL-SCNC: 103 MMOL/L (ref 98–107)
CO2 SERPL-SCNC: 23.2 MMOL/L (ref 22–29)
CREAT SERPL-MCNC: 1.32 MG/DL (ref 0.57–1)
GFR SERPL CREATININE-BSD FRML MDRD: 38 ML/MIN/1.73
GLOBULIN UR ELPH-MCNC: 2.8 GM/DL
GLUCOSE SERPL-MCNC: 92 MG/DL (ref 65–99)
POTASSIUM SERPL-SCNC: 4.5 MMOL/L (ref 3.5–5.2)
PROT SERPL-MCNC: 7.1 G/DL (ref 6–8.5)
SODIUM SERPL-SCNC: 136 MMOL/L (ref 136–145)

## 2021-10-08 PROCEDURE — 36415 COLL VENOUS BLD VENIPUNCTURE: CPT

## 2021-10-08 PROCEDURE — 80053 COMPREHEN METABOLIC PANEL: CPT

## 2021-10-12 ENCOUNTER — OFFICE VISIT (OUTPATIENT)
Dept: FAMILY MEDICINE CLINIC | Age: 86
End: 2021-10-12

## 2021-10-12 VITALS
WEIGHT: 101.4 LBS | BODY MASS INDEX: 15.91 KG/M2 | TEMPERATURE: 97.9 F | DIASTOLIC BLOOD PRESSURE: 53 MMHG | SYSTOLIC BLOOD PRESSURE: 124 MMHG | HEART RATE: 69 BPM | HEIGHT: 67 IN

## 2021-10-12 DIAGNOSIS — R30.0 DYSURIA: Primary | ICD-10-CM

## 2021-10-12 LAB
BILIRUB BLD-MCNC: NEGATIVE MG/DL
CLARITY, POC: CLEAR
COLOR UR: YELLOW
EXPIRATION DATE: ABNORMAL
GLUCOSE UR STRIP-MCNC: NEGATIVE MG/DL
KETONES UR QL: NEGATIVE
LEUKOCYTE EST, POC: ABNORMAL
Lab: ABNORMAL
NITRITE UR-MCNC: POSITIVE MG/ML
PH UR: 5.5 [PH] (ref 5–8)
PROT UR STRIP-MCNC: NEGATIVE MG/DL
RBC # UR STRIP: ABNORMAL /UL
SP GR UR: 1.01 (ref 1–1.03)
UROBILINOGEN UR QL: NORMAL

## 2021-10-12 PROCEDURE — 87086 URINE CULTURE/COLONY COUNT: CPT | Performed by: NURSE PRACTITIONER

## 2021-10-12 PROCEDURE — 99213 OFFICE O/P EST LOW 20 MIN: CPT | Performed by: NURSE PRACTITIONER

## 2021-10-12 PROCEDURE — 81003 URINALYSIS AUTO W/O SCOPE: CPT | Performed by: NURSE PRACTITIONER

## 2021-10-12 PROCEDURE — 87186 SC STD MICRODIL/AGAR DIL: CPT | Performed by: NURSE PRACTITIONER

## 2021-10-12 PROCEDURE — 87077 CULTURE AEROBIC IDENTIFY: CPT | Performed by: NURSE PRACTITIONER

## 2021-10-12 RX ORDER — DOXYCYCLINE HYCLATE 100 MG/1
100 CAPSULE ORAL 2 TIMES DAILY
Qty: 14 CAPSULE | Refills: 0 | Status: SHIPPED | OUTPATIENT
Start: 2021-10-12 | End: 2021-10-19

## 2021-10-12 RX ORDER — MULTIPLE VITAMINS W/ MINERALS TAB 9MG-400MCG
1 TAB ORAL DAILY
COMMUNITY

## 2021-10-12 NOTE — PROGRESS NOTES
"Chief Complaint  Difficulty Urinating    Subjective          Karly Laguerre presents to [facility]  Urinary Tract Infection   This is a new problem. The current episode started yesterday. The problem has been gradually worsening. The quality of the pain is described as burning. The pain is at a severity of 8/10. The pain is moderate. There has been no fever. There is no history of pyelonephritis. Associated symptoms include hesitancy and urgency. Pertinent negatives include no chills, flank pain, frequency, hematuria or nausea. She has tried nothing for the symptoms. There is no history of kidney stones or recurrent UTIs.       Objective   Vital Signs:   /53 (BP Location: Left arm, Patient Position: Sitting)   Pulse 69   Temp 97.9 °F (36.6 °C) (Oral)   Ht 170.2 cm (67\")   Wt 46 kg (101 lb 6.4 oz)   BMI 15.88 kg/m²     Physical Exam  Constitutional:       General: She is not in acute distress.     Appearance: Normal appearance. She is normal weight.   HENT:      Head: Normocephalic.   Eyes:      Pupils: Pupils are equal, round, and reactive to light.      Visual Fields: Right eye visual fields normal and left eye visual fields normal.   Neck:      Trachea: Trachea normal.   Cardiovascular:      Rate and Rhythm: Normal rate and regular rhythm.      Heart sounds: Normal heart sounds.   Pulmonary:      Effort: Pulmonary effort is normal.      Breath sounds: Normal breath sounds and air entry.   Abdominal:      Tenderness: There is no right CVA tenderness or left CVA tenderness.   Musculoskeletal:      Right lower leg: No edema.      Left lower leg: No edema.   Skin:     General: Skin is warm and dry.   Neurological:      Mental Status: She is alert and oriented to person, place, and time.   Psychiatric:         Mood and Affect: Mood and affect normal.         Behavior: Behavior normal.         Thought Content: Thought content normal.        Result Review :            "      Assessment and Plan    Diagnoses and all orders for this visit:    1. Dysuria (Primary)  -     POCT urinalysis dipstick, automated  -     Urine Culture - Urine, Urine, Clean Catch; Future  -     Urine Culture - Urine, Urine, Clean Catch  -     doxycycline (VIBRAMYCIN) 100 MG capsule; Take 1 capsule by mouth 2 (Two) Times a Day for 7 days.  Dispense: 14 capsule; Refill: 0    UA reviewed in office today.  She is nitrite positive.  We will send in doxycycline as she is allergic to sulfa medications and she has decreased kidney function.  We will send urine for culture.    Follow Up   Return if symptoms worsen or fail to improve.  Patient was given instructions and counseling regarding her condition or for health maintenance advice. Please see specific information pulled into the AVS if appropriate.

## 2021-10-12 NOTE — PATIENT INSTRUCTIONS
Doxycycline tablets or capsules  What is this medicine?  DOXYCYCLINE (dox beena hardwick) is a tetracycline antibiotic. It kills certain bacteria or stops their growth. It is used to treat many kinds of infections, like dental, skin, respiratory, and urinary tract infections. It also treats acne, Lyme disease, malaria, and certain sexually transmitted infections.  This medicine may be used for other purposes; ask your health care provider or pharmacist if you have questions.  COMMON BRAND NAME(S): Acticlate, Adoxa, Adoxa CK, Adoxa Andrew, Adoxa TT, Alodox, Avidoxy, Doxal, LYMEPAK, Mondoxyne NL, Monodox, Morgidox 1x, Morgidox 1x Kit, Morgidox 2x, Morgidox 2x Kit, NutriDox, Ocudox, Okebo, Periostat, TARGADOX, Vibra-Tabs, Vibramycin  What should I tell my health care provider before I take this medicine?  They need to know if you have any of these conditions:  · liver disease  · long exposure to sunlight like working outdoors  · stomach problems like colitis  · an unusual or allergic reaction to doxycycline, tetracycline antibiotics, other medicines, foods, dyes, or preservatives  · pregnant or trying to get pregnant  · breast-feeding  How should I use this medicine?  Take this medicine by mouth with a full glass of water. Follow the directions on the prescription label. It is best to take this medicine without food, but if it upsets your stomach take it with food. Take your medicine at regular intervals. Do not take your medicine more often than directed. Take all of your medicine as directed even if you think you are better. Do not skip doses or stop your medicine early.  Talk to your pediatrician regarding the use of this medicine in children. While this drug may be prescribed for selected conditions, precautions do apply.  Overdosage: If you think you have taken too much of this medicine contact a poison control center or emergency room at once.  NOTE: This medicine is only for you. Do not share this medicine with  others.  What if I miss a dose?  If you miss a dose, take it as soon as you can. If it is almost time for your next dose, take only that dose. Do not take double or extra doses.  What may interact with this medicine?  · antacids  · barbiturates  · birth control pills  · bismuth subsalicylate  · carbamazepine  · methoxyflurane  · other antibiotics  · phenytoin  · vitamins that contain iron  · warfarin  This list may not describe all possible interactions. Give your health care provider a list of all the medicines, herbs, non-prescription drugs, or dietary supplements you use. Also tell them if you smoke, drink alcohol, or use illegal drugs. Some items may interact with your medicine.  What should I watch for while using this medicine?  Tell your doctor or health care professional if your symptoms do not improve.  Do not treat diarrhea with over the counter products. Contact your doctor if you have diarrhea that lasts more than 2 days or if it is severe and watery.  Do not take this medicine just before going to bed. It may not dissolve properly when you lay down and can cause pain in your throat. Drink plenty of fluids while taking this medicine to also help reduce irritation in your throat.  This medicine can make you more sensitive to the sun. Keep out of the sun. If you cannot avoid being in the sun, wear protective clothing and use sunscreen. Do not use sun lamps or tanning beds/booths.  Birth control pills may not work properly while you are taking this medicine. Talk to your doctor about using an extra method of birth control.  If you are being treated for a sexually transmitted infection, avoid sexual contact until you have finished your treatment. Your sexual partner may also need treatment.  Avoid antacids, aluminum, calcium, magnesium, and iron products for 4 hours before and 2 hours after taking a dose of this medicine.  If you are using this medicine to prevent malaria, you should still protect yourself  from contact with mosquitos. Stay in screened-in areas, use mosquito nets, keep your body covered, and use an insect repellent.  What side effects may I notice from receiving this medicine?  Side effects that you should report to your doctor or health care professional as soon as possible:  · allergic reactions like skin rash, itching or hives, swelling of the face, lips, or tongue  · difficulty breathing  · fever  · itching in the rectal or genital area  · pain on swallowing  · rash, fever, and swollen lymph nodes  · redness, blistering, peeling or loosening of the skin, including inside the mouth  · severe stomach pain or cramps  · unusual bleeding or bruising  · unusually weak or tired  · yellowing of the eyes or skin  Side effects that usually do not require medical attention (report to your doctor or health care professional if they continue or are bothersome):  · diarrhea  · loss of appetite  · nausea, vomiting  This list may not describe all possible side effects. Call your doctor for medical advice about side effects. You may report side effects to FDA at 3-731-FDA-4660.  Where should I keep my medicine?  Keep out of the reach of children.  Store at room temperature, below 30 degrees C (86 degrees F). Protect from light. Keep container tightly closed. Throw away any unused medicine after the expiration date. Taking this medicine after the expiration date can make you seriously ill.  NOTE: This sheet is a summary. It may not cover all possible information. If you have questions about this medicine, talk to your doctor, pharmacist, or health care provider.  © 2021 [employer]vier/Gold Standard (2020-03-19 13:44:53)

## 2021-10-14 LAB — BACTERIA SPEC AEROBE CULT: ABNORMAL

## 2022-01-25 ENCOUNTER — TELEPHONE (OUTPATIENT)
Dept: FAMILY MEDICINE CLINIC | Age: 87
End: 2022-01-25

## 2022-01-25 DIAGNOSIS — R41.0 CONFUSION: Primary | ICD-10-CM

## 2022-01-25 NOTE — TELEPHONE ENCOUNTER
Pts  said she is more confused than usual. He is wanting to know if they can get her urine checked?

## 2022-01-28 ENCOUNTER — LAB (OUTPATIENT)
Dept: LAB | Facility: HOSPITAL | Age: 87
End: 2022-01-28

## 2022-01-28 DIAGNOSIS — R41.0 CONFUSION: ICD-10-CM

## 2022-01-28 LAB
BACTERIA UR QL AUTO: ABNORMAL /HPF
BILIRUB UR QL STRIP: NEGATIVE
CLARITY UR: CLEAR
COLOR UR: YELLOW
GLUCOSE UR STRIP-MCNC: NEGATIVE MG/DL
GRAN CASTS URNS QL MICRO: ABNORMAL /LPF
HGB UR QL STRIP.AUTO: NEGATIVE
KETONES UR QL STRIP: NEGATIVE
LEUKOCYTE ESTERASE UR QL STRIP.AUTO: ABNORMAL
NITRITE UR QL STRIP: NEGATIVE
PH UR STRIP.AUTO: 6 [PH] (ref 5–8)
PROT UR QL STRIP: ABNORMAL
RBC # UR STRIP: ABNORMAL /HPF
REF LAB TEST METHOD: ABNORMAL
SP GR UR STRIP: 1.02 (ref 1–1.03)
SQUAMOUS #/AREA URNS HPF: ABNORMAL /HPF
UROBILINOGEN UR QL STRIP: ABNORMAL
WBC # UR STRIP: ABNORMAL /HPF

## 2022-01-28 PROCEDURE — 81001 URINALYSIS AUTO W/SCOPE: CPT

## 2022-04-05 ENCOUNTER — TRANSITIONAL CARE MANAGEMENT TELEPHONE ENCOUNTER (OUTPATIENT)
Dept: CALL CENTER | Facility: HOSPITAL | Age: 87
End: 2022-04-05

## 2022-04-05 ENCOUNTER — READMISSION MANAGEMENT (OUTPATIENT)
Dept: CALL CENTER | Facility: HOSPITAL | Age: 87
End: 2022-04-05

## 2022-04-05 NOTE — OUTREACH NOTE
Call Center TCM Note    Flowsheet Row Responses   [facility] patient discharged from? Non-BH   Does the patient have one of the following disease processes/diagnoses(primary or secondary)? General Surgery   TCM attempt successful? No   Unsuccessful attempts Attempt 2          Maya Rowe LPN    4/5/2022, 16:02 EDT

## 2022-04-05 NOTE — OUTREACH NOTE
Call Center TCM Note    Flowsheet Row Responses   [facility] patient discharged from? Non-BH   Does the patient have one of the following disease processes/diagnoses(primary or secondary)? General Surgery   TCM attempt successful? No   Unsuccessful attempts Attempt 1          Jane Mott MA    4/5/2022, 15:52 EDT

## 2022-04-05 NOTE — OUTREACH NOTE
Prep Survey    Flowsheet Row Responses   [facility] from? Non-BH   Is LACE score < 7 ? Non-BH Discharge   Emergency Room discharge w/ pulse ox? No   Eligibility [facility]   [facility]   Date of Admission 04/01/22   Discharge diagnosis Ileostomy Closure   Does the patient have one of the following disease processes/diagnoses(primary or secondary)? General Surgery   Prep survey completed? Yes          YOSEPH BENAVIDEZ - Registered Nurse

## 2022-04-06 ENCOUNTER — TRANSITIONAL CARE MANAGEMENT TELEPHONE ENCOUNTER (OUTPATIENT)
Dept: CALL CENTER | Facility: HOSPITAL | Age: 87
End: 2022-04-06

## 2022-04-06 NOTE — OUTREACH NOTE
Call Center TCM Note    Flowsheet Row Responses   [facility] patient [facility] from? Non-  [[facility]]   Does the patient have one of the following disease processes/diagnoses(primary or secondary)? General Surgery   TCM attempt successful? No  [no verbal release]   Unsuccessful attempts Attempt 3          Farheen Antoine RN    4/6/2022, 09:55 EDT

## 2022-05-17 ENCOUNTER — TELEPHONE (OUTPATIENT)
Dept: FAMILY MEDICINE CLINIC | Age: 87
End: 2022-05-17

## 2022-05-17 DIAGNOSIS — R41.0 CONFUSION: Primary | ICD-10-CM

## 2022-05-17 NOTE — TELEPHONE ENCOUNTER
Labs received from [facility] 4/22/19  Potassium 3.0 bun 34 and creat 1.3  Hgb 11.4 no probnp   Please call and see what her weight is doing and any sob  Confirm doses of metolazone, aldactone and lasix  thanks Okay for a u/a culture if indicated for that dg

## 2022-05-17 NOTE — TELEPHONE ENCOUNTER
Caller: RADHA DENNISON    Relationship to patient: Emergency Contact    Best call back number: [phone]    Patient is needing: PATIENTS SON CALLED STATING THE PATIENT HAS DEMENTIA AND HAS BEEN ACTING A LOT WORSE THAN NORMAL. THE PATIENTS SON WOULD LIKE A URINE SAMPLE TO BE ORDERED DUE TO THINKING THE PATIENT MIGHT HAVE A KIDNEY INFECTION. THE PATIENTS SON WOULD LIKE A CALL BACK TO LET HIM KNOW THIS HAS BEEN ORDERED PLEASE ADVISE THANK YOU.

## 2022-05-18 ENCOUNTER — LAB (OUTPATIENT)
Dept: LAB | Facility: HOSPITAL | Age: 87
End: 2022-05-18

## 2022-05-18 DIAGNOSIS — R41.0 CONFUSION: ICD-10-CM

## 2022-05-18 LAB
BILIRUB UR QL STRIP: NEGATIVE
CLARITY UR: CLEAR
COLOR UR: YELLOW
GLUCOSE UR STRIP-MCNC: NEGATIVE MG/DL
HGB UR QL STRIP.AUTO: NEGATIVE
KETONES UR QL STRIP: NEGATIVE
LEUKOCYTE ESTERASE UR QL STRIP.AUTO: NEGATIVE
NITRITE UR QL STRIP: NEGATIVE
PH UR STRIP.AUTO: 7 [PH] (ref 5–8)
PROT UR QL STRIP: NEGATIVE
SP GR UR STRIP: 1.02 (ref 1–1.03)
UROBILINOGEN UR QL STRIP: NORMAL

## 2022-05-18 PROCEDURE — 81003 URINALYSIS AUTO W/O SCOPE: CPT

## 2022-07-19 ENCOUNTER — OFFICE VISIT (OUTPATIENT)
Dept: FAMILY MEDICINE CLINIC | Age: 87
End: 2022-07-19

## 2022-07-19 ENCOUNTER — LAB (OUTPATIENT)
Dept: LAB | Facility: HOSPITAL | Age: 87
End: 2022-07-19

## 2022-07-19 VITALS
HEART RATE: 63 BPM | DIASTOLIC BLOOD PRESSURE: 59 MMHG | BODY MASS INDEX: 17.3 KG/M2 | HEIGHT: 67 IN | WEIGHT: 110.2 LBS | OXYGEN SATURATION: 99 % | SYSTOLIC BLOOD PRESSURE: 135 MMHG | TEMPERATURE: 98 F

## 2022-07-19 DIAGNOSIS — C20 RECTAL CANCER: ICD-10-CM

## 2022-07-19 DIAGNOSIS — R41.3 MEMORY LOSS OR IMPAIRMENT: Primary | ICD-10-CM
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
## 2022-07-19 DIAGNOSIS — I48.0 PAROXYSMAL ATRIAL FIBRILLATION: ICD-10-CM

## 2022-07-19 LAB
ALBUMIN SERPL-MCNC: 4.2 G/DL (ref 3.5–5.2)
ALBUMIN/GLOB SERPL: 1.6 G/DL
ALP SERPL-CCNC: 93 U/L (ref 39–117)
ALT SERPL W P-5'-P-CCNC: 13 U/L (ref 1–33)
ANION GAP SERPL CALCULATED.3IONS-SCNC: 8 MMOL/L (ref 5–15)
AST SERPL-CCNC: 19 U/L (ref 1–32)
BASOPHILS # BLD AUTO: 0.03 10*3/MM3 (ref 0–0.2)
BASOPHILS NFR BLD AUTO: 0.4 % (ref 0–1.5)
BILIRUB SERPL-MCNC: 0.2 MG/DL (ref 0–1.2)
BUN SERPL-MCNC: 25 MG/DL (ref 8–23)
BUN/CREAT SERPL: 28.1 (ref 7–25)
CALCIUM SPEC-SCNC: 9.2 MG/DL (ref 8.6–10.5)
CHLORIDE SERPL-SCNC: 101 MMOL/L (ref 98–107)
CO2 SERPL-SCNC: 29 MMOL/L (ref 22–29)
CREAT SERPL-MCNC: 0.89 MG/DL (ref 0.57–1)
DEPRECATED RDW RBC AUTO: 46.9 FL (ref 37–54)
EGFRCR SERPLBLD CKD-EPI 2021: 64 ML/MIN/1.73
EOSINOPHIL # BLD AUTO: 0.18 10*3/MM3 (ref 0–0.4)
EOSINOPHIL NFR BLD AUTO: 2.1 % (ref 0.3–6.2)
ERYTHROCYTE [DISTWIDTH] IN BLOOD BY AUTOMATED COUNT: 13.9 % (ref 12.3–15.4)
GLOBULIN UR ELPH-MCNC: 2.6 GM/DL
GLUCOSE SERPL-MCNC: 99 MG/DL (ref 65–99)
HCT VFR BLD AUTO: 42.2 % (ref 34–46.6)
HGB BLD-MCNC: 13.4 G/DL (ref 12–15.9)
IMM GRANULOCYTES # BLD AUTO: 0.02 10*3/MM3 (ref 0–0.05)
IMM GRANULOCYTES NFR BLD AUTO: 0.2 % (ref 0–0.5)
LYMPHOCYTES # BLD AUTO: 1.75 10*3/MM3 (ref 0.7–3.1)
LYMPHOCYTES NFR BLD AUTO: 20.5 % (ref 19.6–45.3)
MCH RBC QN AUTO: 28.9 PG (ref 26.6–33)
MCHC RBC AUTO-ENTMCNC: 31.8 G/DL (ref 31.5–35.7)
MCV RBC AUTO: 90.9 FL (ref 79–97)
MONOCYTES # BLD AUTO: 0.85 10*3/MM3 (ref 0.1–0.9)
MONOCYTES NFR BLD AUTO: 10 % (ref 5–12)
NEUTROPHILS NFR BLD AUTO: 5.7 10*3/MM3 (ref 1.7–7)
NEUTROPHILS NFR BLD AUTO: 66.8 % (ref 42.7–76)
PLATELET # BLD AUTO: 196 10*3/MM3 (ref 140–450)
PMV BLD AUTO: 9.6 FL (ref 6–12)
POTASSIUM SERPL-SCNC: 4.4 MMOL/L (ref 3.5–5.2)
PROT SERPL-MCNC: 6.8 G/DL (ref 6–8.5)
RBC # BLD AUTO: 4.64 10*6/MM3 (ref 3.77–5.28)
SODIUM SERPL-SCNC: 138 MMOL/L (ref 136–145)
WBC NRBC COR # BLD: 8.53 10*3/MM3 (ref 3.4–10.8)

## 2022-07-19 PROCEDURE — 99214 OFFICE O/P EST MOD 30 MIN: CPT | Performed by: NURSE PRACTITIONER

## 2022-07-19 PROCEDURE — 36415 COLL VENOUS BLD VENIPUNCTURE: CPT

## 2022-07-19 PROCEDURE — 85025 COMPLETE CBC W/AUTO DIFF WBC: CPT

## 2022-07-19 PROCEDURE — 80053 COMPREHEN METABOLIC PANEL: CPT

## 2022-07-19 RX ORDER — TRAZODONE HYDROCHLORIDE 50 MG/1
50 TABLET ORAL NIGHTLY
COMMUNITY

## 2022-07-19 NOTE — ASSESSMENT & PLAN NOTE
Discussed with pt and with Sonali her sister with her today,  Placed order for [facility] , who has been there in the past to evaluate her and set up some PT/ OT  Reviewed CT head, continue to observe hematoma, send for labs done at [facility], rechecking some labs today

## 2022-07-19 NOTE — PROGRESS NOTES
Karly Laguerre presents to [facility].    Chief Complaint:  Fall (Pt fell about 4 weeks ago and went to [facility]. They did a CT, pt had a knot on her head and bruised ribs. Pt c/o (R) side of head, (R) rib pain and unsteady gait. Pt's sister also would like a referral to Physical Therapy. ) Sonali her sister with her today          History of Present Illness:  Fall :   Up during the night by herself believed to have hit wall, her son and  were home, asleep  Went to [facility] ER   Occurred: 22  Head CT, Large right frontal hematoma, no other acute findings  Family feels like she has an unsteady gait, would like order for PT     PAST MEDICAL HISTORY changes since :       A fib  HLD  Dementia  Rectal cancer: had rectal resection / ileostomy 3-2021    Lung cancer: dx'd in ; Stage 1 adenocarcinoma; VATS procedure / path benign process;     retinal detachment in / had surgery R         GYNECOLOGICAL HISTORY:             PREVENTIVE HEALTH MAINTENANCE             BONE DENSITY: was last done 19 with the following abnormality noted-- osteopenia                Surgical History:       Flex sigmoidoscopy  2022 colonoscopy   Ostomy repair : loop iliostomy closure with small bowel resection and enterolysis     Biopsy of breast; benign    Cataract Removal: bilateral; ;     Cholecystectomy    Dilation and Curettage  Colonoscopy 3-2017    Hysterectomy: rectocele repair / BSO;         Positive for    VATS 10-13-20;;         Family History:     Father:  at age 32; Cause of death was MVA     Mother:  at age 61; Cause of death was CLL     Brother(s): 2 brother(s) total;  Coronary Artery Disease; Hypertension     Sister(s): 8 sister(s) total; 2 ;  Hypertension;  Colon Cancer;  COPD         Social History:     Occupation: Retired (Prior occupation: RN)     Marital Status:      Children: 2 children       Review of  "Systems:  Review of Systems   Constitutional: Negative for fever.   Respiratory: Negative for cough and shortness of breath.    Cardiovascular: Negative for chest pain, palpitations and leg swelling.   Psychiatric/Behavioral: Positive for sleep disturbance (sleeping better when takes Trazodone ).          Current Outpatient Medications:   •  Cyanocobalamin (VITAMIN B12 PO), Take  by mouth., Disp: , Rfl:   •  dilTIAZem (CARDIZEM) 30 MG tablet, Take 30 mg by mouth 3 (Three) Times a Day., Disp: , Rfl:   •  metoprolol tartrate (LOPRESSOR) 100 MG tablet, Take 100 mg by mouth 2 (Two) Times a Day., Disp: , Rfl:   •  multivitamin with minerals tablet tablet, Take 1 tablet by mouth Daily., Disp: , Rfl:   •  ondansetron ODT (ZOFRAN-ODT) 4 MG disintegrating tablet, Place 4 mg under the tongue As Needed., Disp: , Rfl:   •  rivaroxaban (XARELTO) 15 MG tablet, Take 15 mg by mouth Daily., Disp: , Rfl:   •  traZODone (DESYREL) 50 MG tablet, Take 50 mg by mouth Every Night., Disp: , Rfl:     Vital Signs:   Vitals:    07/19/22 1148   BP: 135/59   BP Location: Left arm   Patient Position: Sitting   Cuff Size: Adult   Pulse: 63   Temp: 98 °F (36.7 °C)   TempSrc: Oral   SpO2: 99%  Comment: room air   Weight: 50 kg (110 lb 3.2 oz)   Height: 170.2 cm (67.01\")         Physical Exam:  Physical Exam  Vitals reviewed.   Constitutional:       General: She is not in acute distress.     Appearance: Normal appearance.   Neck:      Vascular: No carotid bruit.   Cardiovascular:      Rate and Rhythm: Normal rate and regular rhythm.      Heart sounds: Normal heart sounds. No murmur heard.  Pulmonary:      Effort: Pulmonary effort is normal. No respiratory distress.      Breath sounds: Normal breath sounds.   Musculoskeletal:      Right lower leg: Edema (trace) present.      Left lower leg: Edema (trace) present.      Comments: Gait slow    Skin:     Comments: Large hematoma right forehead/tmeple, dried blood in place, bruising below right eye  "   Neurological:      Mental Status: She is alert.   Psychiatric:         Behavior: Behavior is cooperative.         Cognition and Memory: Memory is impaired.         Result Review      The following data was reviewed by: PAULIE Barajas on 07/19/2022:    Results for orders placed or performed in visit on 05/18/22   Urinalysis With Culture If Indicated - Urine, Clean Catch    Specimen: Urine, Clean Catch   Result Value Ref Range    Color, UA Yellow Yellow, Straw    Appearance, UA Clear Clear    pH, UA 7.0 5.0 - 8.0    Specific Gravity, UA 1.025 1.005 - 1.030    Glucose, UA Negative Negative    Ketones, UA Negative Negative    Bilirubin, UA Negative Negative    Blood, UA Negative Negative    Protein, UA Negative Negative    Leuk Esterase, UA Negative Negative    Nitrite, UA Negative Negative    Urobilinogen, UA 0.2 E.U./dL 0.2 - 1.0 E.U./dL               Assessment and Plan:          Diagnoses and all orders for this visit:    1. Memory loss or impairment (Primary)  Assessment & Plan:  Recommended continued support re her rx      2. Paroxysmal atrial fibrillation (HCC)  Assessment & Plan:  Sees cardiology / has a follow up appt  Her son does her rx planner and her  sees that she takes her rx   She needs to watch salt and elevated legs frequently, may need to see cardiology sooner       3. Rectal cancer (HCC)  Assessment & Plan:  Her sister reports she has done well since her ileostomy take down in regards to her bowels     Orders:  -     CBC w AUTO Differential; Future    4. Fall, initial encounter  Assessment & Plan:  Discussed with pt and with Sonali her sister with her today,  Placed order for [facility] , who has been there in the past to evaluate her and set up some PT/ OT  Reviewed CT head, continue to observe hematoma, send for labs done at [facility], rechecking some labs today     Orders:  -     Ambulatory Referral to Home Health  -     CBC w AUTO Differential; Future  -     Comprehensive metabolic  panel; Future        Follow Up   Return for followup pending lab results.  Patient was given instructions and counseling regarding her condition or for health maintenance advice. Please see specific information pulled into the AVS if appropriate.

## 2022-07-19 NOTE — ASSESSMENT & PLAN NOTE
Sees cardiology / has a follow up appt  Her son does her rx planner and her  sees that she takes her rx   She needs to watch salt and elevated legs frequently, may need to see cardiology sooner

## 2022-08-05 ENCOUNTER — TELEPHONE (OUTPATIENT)
Dept: FAMILY MEDICINE CLINIC | Age: 87
End: 2022-08-05

## 2022-08-05 NOTE — TELEPHONE ENCOUNTER
Received fax from [facility] with possible interaction with Diltaizem and Xarelto.   NEPTALI Bolivar said to consult Cardio about this.   LM with Aide at [facility] health.

## 2022-08-16 ENCOUNTER — OUTSIDE FACILITY SERVICE (OUTPATIENT)
Dept: FAMILY MEDICINE CLINIC | Age: 87
End: 2022-08-16

## 2022-08-16 PROCEDURE — OUTSIDEPOS PR OUTSIDE POS PLACEHOLDER: Performed by: NURSE PRACTITIONER

## 2022-10-25 ENCOUNTER — OFFICE VISIT (OUTPATIENT)
Dept: FAMILY MEDICINE CLINIC | Age: 87
End: 2022-10-25

## 2022-10-25 ENCOUNTER — TELEPHONE (OUTPATIENT)
Dept: FAMILY MEDICINE CLINIC | Age: 87
End: 2022-10-25

## 2022-10-25 VITALS
HEIGHT: 67 IN | DIASTOLIC BLOOD PRESSURE: 78 MMHG | BODY MASS INDEX: 17.74 KG/M2 | SYSTOLIC BLOOD PRESSURE: 154 MMHG | WEIGHT: 113 LBS | HEART RATE: 67 BPM | OXYGEN SATURATION: 95 % | TEMPERATURE: 98.8 F

## 2022-10-25 DIAGNOSIS — U07.1 COVID: Primary | ICD-10-CM

## 2022-10-25 DIAGNOSIS — R05.1 ACUTE COUGH: ICD-10-CM

## 2022-10-25 DIAGNOSIS — R03.0 ELEVATED BLOOD PRESSURE READING: ICD-10-CM

## 2022-10-25 LAB
EXPIRATION DATE: ABNORMAL
FLUAV AG UPPER RESP QL IA.RAPID: NOT DETECTED
FLUBV AG UPPER RESP QL IA.RAPID: NOT DETECTED
INTERNAL CONTROL: ABNORMAL
Lab: ABNORMAL
SARS-COV-2 AG UPPER RESP QL IA.RAPID: DETECTED

## 2022-10-25 PROCEDURE — 99213 OFFICE O/P EST LOW 20 MIN: CPT

## 2022-10-25 PROCEDURE — 87428 SARSCOV & INF VIR A&B AG IA: CPT

## 2022-10-25 RX ORDER — METOPROLOL SUCCINATE 200 MG/1
TABLET, EXTENDED RELEASE ORAL
COMMUNITY
Start: 2022-10-05

## 2022-10-25 NOTE — TELEPHONE ENCOUNTER
Spoke with patient's son Cyril Laguerre and he states he will be having his spouse fax over the POA paper work regarding his mother Karly.

## 2022-10-25 NOTE — PROGRESS NOTES
"Subjective     CHIEF COMPLAINT    Chief Complaint   Patient presents with   • Cough     Cough, congestion, runny nose, X 2 days   Confusion recently      History of Present Illness  Patient is a 87-year-old female who presents to the clinic today with her sister Nena.  Patient does have memory issues therefore history was also completed with assistance of Nena.  Patient has had some cough, congestion and runny nose since yesterday. She denies any shortness of breath, chest pain, chills or fever.  She does note a hoarse voice.  She lives with her  who noticed her symptoms.    Review of Systems   Constitutional: Negative for chills and fever.   HENT: Positive for congestion and postnasal drip. Negative for sore throat (hoarse ).    Respiratory: Positive for cough. Negative for shortness of breath.    Cardiovascular: Negative for chest pain.   Genitourinary: Negative for dysuria, frequency and hematuria.     Allergies   Allergen Reactions   • Penicillins Rash   • Ace Inhibitors Swelling   • Sulfamethoxazole-Trimethoprim Rash       Current Outpatient Medications on File Prior to Visit   Medication Sig Dispense Refill   • Cyanocobalamin (VITAMIN B12 PO) Take  by mouth Daily.     • dilTIAZem (CARDIZEM) 30 MG tablet Take 30 mg by mouth 3 (Three) Times a Day.     • multivitamin with minerals tablet tablet Take 1 tablet by mouth Daily.     • ondansetron ODT (ZOFRAN-ODT) 4 MG disintegrating tablet Place 4 mg under the tongue As Needed.     • rivaroxaban (XARELTO) 15 MG tablet Take 15 mg by mouth Daily.     • traZODone (DESYREL) 50 MG tablet Take 50 mg by mouth Every Night.     • metoprolol succinate XL (TOPROL-XL) 200 MG 24 hr tablet        No current facility-administered medications on file prior to visit.     /78 (BP Location: Left arm, Patient Position: Sitting)   Pulse 67   Temp 98.8 °F (37.1 °C) (Oral)   Ht 170.2 cm (67.01\")   Wt 51.3 kg (113 lb)   SpO2 95%   BMI 17.69 kg/m²        Objective "     Physical Exam  Vitals and nursing note reviewed.   Constitutional:       General: She is not in acute distress.     Appearance: Normal appearance. She is not ill-appearing.   HENT:      Head: Normocephalic.      Nose: Nose normal.      Mouth/Throat:      Mouth: Mucous membranes are moist.   Eyes:      Extraocular Movements: Extraocular movements intact.      Pupils: Pupils are equal, round, and reactive to light.   Cardiovascular:      Rate and Rhythm: Normal rate and regular rhythm.      Heart sounds: Normal heart sounds.   Pulmonary:      Effort: Pulmonary effort is normal. No respiratory distress.      Breath sounds: Normal breath sounds. No wheezing or rhonchi.   Skin:     General: Skin is warm and dry.   Neurological:      Mental Status: She is alert.      Comments: Oriented to self and place    Psychiatric:         Mood and Affect: Mood normal.         Behavior: Behavior normal.            Results for orders placed or performed in visit on 10/25/22   POCT SARS-CoV-2 Antigen JAIME + Flu    Specimen: Swab   Result Value Ref Range    SARS Antigen Detected (A) Not Detected, Presumptive Negative    Influenza A Antigen JAIME Not Detected Not Detected    Influenza B Antigen JAIME Not Detected Not Detected    Internal Control Passed Passed    Lot Number [id number]     Expiration Date 8/2/23         Diagnoses and all orders for this visit:    1. COVID (Primary)  -     Molnupiravir (LAGEVRIO) 200 MG capsule; Take 4 capsules by mouth Every 12 (Twelve) Hours for 5 days.  Dispense: 40 capsule; Refill: 0    2. Acute cough  -     POCT SARS-CoV-2 Antigen JAIME + Flu    3. Elevated blood pressure reading  Comments:  May be due to acute illness, ambulatory monitoring. Follow up with PCP.       Patient is a candidate for Paxlovid. GFR was 64.0 in July.  Medications reviewed with pharmacy, there is a interaction with Xarelto and Paxlovid which would increase the concentration of Xarelto.  It is recommended to avoid the combination of  these 2 medications.  Patient's cardiologist Dr. Graham prescribes the Xarelto.  We contacted his office to see if he was okay with holding the Xarelto for the duration of Paxlovid as well as 3 days following Paxlovid but are awiating a response.     I spoke with patient's son Domingo Laguerre regarding positive COVID test and Paxlovid interactions.  He would like to wait to hear back from cardiologist tomorrow before making a decision regarding Paxlovid.  Patient's symptoms started yesterday therefore we are still within the timeframe to begin Paxlovid. If Paxlovid is not an option, can consider molnipurivir.     Isolate per CDC guidelines. Symptomatic treatment with OTC analgesics, mucinex, flonase, Increase fluid intake. Monitor for SOB, chest pain, leg swelling and proceed to ER if present. Discussed all precautions with patient, sister Nena and son Domingo Laguerre, son states he is POA and is listed as HCS in chart.     Addendum 10/26/22: Cardiologist does not recommend patient stop xarelto as places her at high risk for stroke. Would not recommend patient take Paxlovid. Patient is a candidate for Molnupiravir. Discussed this with patients son. He is interested in Molnupiravir. Given EUA of medication, need documentation on file of son being POA to prescribe medication. Cyril to fax paperwork to office. Patient still within timeframe to start medication, must begin on 10/28/22 at the latest.      Addendum 10/27/22: received POA paperwork, notarized and signed to indicate Domingo Laguerre Jr as surrogate for health care decisions. Based upon my most recent evaluation of patient, [facility] moln[facility]. POA has granted permission for patient to take Molnupiravir under EUA     Return if symptoms worsen or fail to improve.     FOR FULL DISCHARGE INSTRUCTIONS/COMMENTS/HANDOUTS please see the AVS

## 2022-10-26 ENCOUNTER — TELEPHONE (OUTPATIENT)
Dept: FAMILY MEDICINE CLINIC | Age: 87
End: 2022-10-26

## 2022-10-26 NOTE — TELEPHONE ENCOUNTER
Spoke to Kianna at Cardio office and she states the MD states they would NOT advise the pt to go off the Xarelto.  She would be at too much of a risk of stroke.

## 2022-10-26 NOTE — TELEPHONE ENCOUNTER
----- Message from PAULIE Evans sent at 10/25/2022  3:04 PM EDT -----  Regarding: Please call cardiologist  Please call patients cardiologist Garrett Graham MD with [facility] cardiology. Notify them that patient has tested positive for Covid and we are considering starting her on Paxlovid. However, Paxlovid interacts with Xarelto and could increase the concentration of Xarelto. See if they would be ok with patient holding Xarelto for 5 days of Paxlovid and for at least 3 days after completing Paxlovid.

## 2022-10-27 NOTE — TELEPHONE ENCOUNTER
Noted. I spoke with patients son Cyril this afternoon around 5pm. I informed him of cardiologists recommendations. Would not recommend Paxlovid for this patient due to cardiologist recommendation. However, patient is a candidate for Molnupiravir. Cyril is interested in Molnupiravir for patient. Cyril states that he is POA for patient and would make her health care decisions, however, I informed him that we need this POA documentation on file in her chart in order to proceed with Molnupiravir. Cyril can fax the paperwork to office or drop off copy. I provided him with the fax number and he states he will send it tomorrow. Patient is still within the 5 day timeframe to begin medication (tomorrow is day 4) and I informed Cyril of this.     Pod A: Please follow up on this tomorrow and see if we have received the paperwork.

## 2022-10-27 NOTE — TELEPHONE ENCOUNTER
Received POA & Living Will forms for Karly Laguerre. Forms have been scanned to HIM advanced care plan & is located in living will drawer at .

## 2023-10-23 ENCOUNTER — OFFICE VISIT (OUTPATIENT)
Dept: FAMILY MEDICINE CLINIC | Age: 88
End: 2023-10-23
Payer: MEDICARE

## 2023-10-23 VITALS
HEART RATE: 58 BPM | BODY MASS INDEX: 17.69 KG/M2 | TEMPERATURE: 98.8 F | SYSTOLIC BLOOD PRESSURE: 131 MMHG | HEIGHT: 67 IN | DIASTOLIC BLOOD PRESSURE: 71 MMHG | OXYGEN SATURATION: 96 %

## 2023-10-23 DIAGNOSIS — R35.0 URINARY FREQUENCY: Primary | ICD-10-CM

## 2023-10-23 DIAGNOSIS — N30.01 ACUTE CYSTITIS WITH HEMATURIA: ICD-10-CM

## 2023-10-23 LAB
BILIRUB BLD-MCNC: NEGATIVE MG/DL
CLARITY, POC: CLEAR
COLOR UR: YELLOW
EXPIRATION DATE: ABNORMAL
GLUCOSE UR STRIP-MCNC: NEGATIVE MG/DL
KETONES UR QL: NEGATIVE
LEUKOCYTE EST, POC: ABNORMAL
Lab: ABNORMAL
NITRITE UR-MCNC: POSITIVE MG/ML
PH UR: 5.5 [PH] (ref 5–8)
PROT UR STRIP-MCNC: ABNORMAL MG/DL
RBC # UR STRIP: ABNORMAL /UL
SP GR UR: 1.02 (ref 1–1.03)
UROBILINOGEN UR QL: NORMAL

## 2023-10-23 PROCEDURE — 1159F MED LIST DOCD IN RCRD: CPT | Performed by: NURSE PRACTITIONER

## 2023-10-23 PROCEDURE — 81003 URINALYSIS AUTO W/O SCOPE: CPT | Performed by: NURSE PRACTITIONER

## 2023-10-23 PROCEDURE — 1160F RVW MEDS BY RX/DR IN RCRD: CPT | Performed by: NURSE PRACTITIONER

## 2023-10-23 PROCEDURE — 99213 OFFICE O/P EST LOW 20 MIN: CPT | Performed by: NURSE PRACTITIONER

## 2023-10-23 RX ORDER — MEMANTINE HYDROCHLORIDE 5 MG/1
2 TABLET ORAL 2 TIMES DAILY
COMMUNITY
Start: 2023-09-29

## 2023-10-23 RX ORDER — NITROFURANTOIN 25; 75 MG/1; MG/1
100 CAPSULE ORAL 2 TIMES DAILY
Qty: 10 CAPSULE | Refills: 0 | Status: SHIPPED | OUTPATIENT
Start: 2023-10-23 | End: 2023-10-28

## 2023-10-23 NOTE — PROGRESS NOTES
"Chief Complaint  Urinary Frequency (Sx started )    Subjective        Karly Laguerre presents to [facility]  Urinary Tract Infection   This is a new problem. The current episode started 1 to 4 weeks ago. There has been no fever. Associated symptoms comments: Confusion, incontinence .   Son with patient reports more confusion.    Objective   Vital Signs:  /71 (BP Location: Left arm, Patient Position: Sitting, Cuff Size: Adult)   Pulse 58   Temp 98.8 °F (37.1 °C) (Oral)   Ht 170.2 cm (67.01\")   SpO2 96% Comment: room air  BMI 17.69 kg/m²   Estimated body mass index is 17.69 kg/m² as calculated from the following:    Height as of this encounter: 170.2 cm (67.01\").    Weight as of 10/25/22: 51.3 kg (113 lb).             Physical Exam  Cardiovascular:      Rate and Rhythm: Bradycardia present.   Pulmonary:      Effort: Pulmonary effort is normal.   Abdominal:      Tenderness: There is no right CVA tenderness or left CVA tenderness.   Skin:     General: Skin is warm and dry.   Neurological:      Mental Status: She is alert. Mental status is at baseline.   Psychiatric:         Mood and Affect: Mood normal.        Result Review :          Results for orders placed or performed in visit on 10/23/23   POCT urinalysis dipstick, automated    Specimen: Urine   Result Value Ref Range    Color Yellow Yellow, Straw, Dark Yellow, Radha    Clarity, UA Clear Clear    Specific Gravity  1.025 1.005 - 1.030    pH, Urine 5.5 5.0 - 8.0    Leukocytes Trace (A) Negative    Nitrite, UA Positive (A) Negative    Protein, POC 30 mg/dL (A) Negative mg/dL    Glucose, UA Negative Negative mg/dL    Ketones, UA Negative Negative    Urobilinogen, UA Normal Normal, 0.2 E.U./dL    Bilirubin Negative Negative    Blood, UA Moderate (A) Negative    Lot Number [id number]     Expiration Date 04-30-24               Assessment and Plan   Diagnoses and all orders for this visit:    1. Urinary frequency " (Primary)  -     POCT urinalysis dipstick, automated    2. Acute cystitis with hematuria  -     nitrofurantoin, macrocrystal-monohydrate, (Macrobid) 100 MG capsule; Take 1 capsule by mouth 2 (Two) Times a Day for 5 days.  Dispense: 10 capsule; Refill: 0             Follow Up   Return if symptoms worsen or fail to improve.  Patient was given instructions and counseling regarding her condition or for health maintenance advice. Please see specific information pulled into the AVS if appropriate.

## 2023-10-27 ENCOUNTER — LAB (OUTPATIENT)
Dept: LAB | Facility: HOSPITAL | Age: 88
End: 2023-10-27
Payer: MEDICARE

## 2023-10-27 ENCOUNTER — OFFICE VISIT (OUTPATIENT)
Dept: FAMILY MEDICINE CLINIC | Age: 88
End: 2023-10-27
Payer: MEDICARE

## 2023-10-27 VITALS
TEMPERATURE: 97.3 F | OXYGEN SATURATION: 100 % | HEART RATE: 57 BPM | WEIGHT: 112.2 LBS | SYSTOLIC BLOOD PRESSURE: 133 MMHG | HEIGHT: 67 IN | BODY MASS INDEX: 17.61 KG/M2 | DIASTOLIC BLOOD PRESSURE: 43 MMHG

## 2023-10-27 DIAGNOSIS — R41.0 CONFUSION: ICD-10-CM

## 2023-10-27 DIAGNOSIS — Z23 ENCOUNTER FOR VACCINATION: ICD-10-CM

## 2023-10-27 DIAGNOSIS — R35.0 URINARY FREQUENCY: ICD-10-CM

## 2023-10-27 DIAGNOSIS — R80.9 PROTEINURIA, UNSPECIFIED TYPE: Primary | ICD-10-CM

## 2023-10-27 DIAGNOSIS — R80.9 PROTEINURIA, UNSPECIFIED TYPE: ICD-10-CM

## 2023-10-27 LAB
ALBUMIN SERPL-MCNC: 3.2 G/DL (ref 3.5–5.2)
ALBUMIN/GLOB SERPL: 1 G/DL
ALP SERPL-CCNC: 262 U/L (ref 39–117)
ALT SERPL W P-5'-P-CCNC: 433 U/L (ref 1–33)
ANION GAP SERPL CALCULATED.3IONS-SCNC: 9.9 MMOL/L (ref 5–15)
AST SERPL-CCNC: 441 U/L (ref 1–32)
BILIRUB SERPL-MCNC: 0.5 MG/DL (ref 0–1.2)
BUN SERPL-MCNC: 21 MG/DL (ref 8–23)
BUN/CREAT SERPL: 21.2 (ref 7–25)
CALCIUM SPEC-SCNC: 8.8 MG/DL (ref 8.6–10.5)
CHLORIDE SERPL-SCNC: 105 MMOL/L (ref 98–107)
CO2 SERPL-SCNC: 26.1 MMOL/L (ref 22–29)
CREAT SERPL-MCNC: 0.99 MG/DL (ref 0.57–1)
DEPRECATED RDW RBC AUTO: 43.8 FL (ref 37–54)
EGFRCR SERPLBLD CKD-EPI 2021: 55.6 ML/MIN/1.73
ERYTHROCYTE [DISTWIDTH] IN BLOOD BY AUTOMATED COUNT: 13.1 % (ref 12.3–15.4)
GLOBULIN UR ELPH-MCNC: 3.3 GM/DL
GLUCOSE SERPL-MCNC: 105 MG/DL (ref 65–99)
HCT VFR BLD AUTO: 40.9 % (ref 34–46.6)
HGB BLD-MCNC: 13 G/DL (ref 12–15.9)
MCH RBC QN AUTO: 28.5 PG (ref 26.6–33)
MCHC RBC AUTO-ENTMCNC: 31.8 G/DL (ref 31.5–35.7)
MCV RBC AUTO: 89.7 FL (ref 79–97)
PLATELET # BLD AUTO: 276 10*3/MM3 (ref 140–450)
PMV BLD AUTO: 9.3 FL (ref 6–12)
POTASSIUM SERPL-SCNC: 4 MMOL/L (ref 3.5–5.2)
PROT SERPL-MCNC: 6.5 G/DL (ref 6–8.5)
RBC # BLD AUTO: 4.56 10*6/MM3 (ref 3.77–5.28)
SODIUM SERPL-SCNC: 141 MMOL/L (ref 136–145)
WBC NRBC COR # BLD: 8.04 10*3/MM3 (ref 3.4–10.8)

## 2023-10-27 PROCEDURE — 36415 COLL VENOUS BLD VENIPUNCTURE: CPT

## 2023-10-27 PROCEDURE — 85027 COMPLETE CBC AUTOMATED: CPT

## 2023-10-27 PROCEDURE — 80053 COMPREHEN METABOLIC PANEL: CPT

## 2023-10-27 NOTE — PROGRESS NOTES
"Chief Complaint  Urinary Frequency (Follow up )    Subjective    Patient is in today accompanied by her son with complaints of continued urinary frequency and confusion.  Son with patient reports that  who once cared for patient is now dealing with his own health issues and is not able to assist patient. Son and daughters have been helping and noticing a progressive decline in patient cognition and memory. She tells stories and talks about thing that do not make since.         Karly Laguerre presents to [facility]  Memory Loss  This is a chronic problem. The problem has been gradually worsening. Associated symptoms include fatigue, urinary symptoms and weakness. Nothing aggravates the symptoms.       Objective   Vital Signs:  /43 (BP Location: Right arm, Patient Position: Sitting, Cuff Size: Adult)   Pulse 57   Temp 97.3 °F (36.3 °C) (Oral)   Ht 170.2 cm (67.01\")   Wt 50.9 kg (112 lb 3.2 oz)   SpO2 100% Comment: room air  BMI 17.57 kg/m²   Estimated body mass index is 17.57 kg/m² as calculated from the following:    Height as of this encounter: 170.2 cm (67.01\").    Weight as of this encounter: 50.9 kg (112 lb 3.2 oz).             Physical Exam  Cardiovascular:      Rate and Rhythm: Normal rate and regular rhythm.   Pulmonary:      Effort: Pulmonary effort is normal.   Skin:     General: Skin is warm and dry.   Neurological:      Mental Status: She is alert. Mental status is at baseline.   Psychiatric:         Mood and Affect: Mood normal.        Result Review :                   Assessment and Plan   Diagnoses and all orders for this visit:    1. Proteinuria, unspecified type (Primary)  -     Comprehensive metabolic panel; Future  -     CBC No Differential; Future    2. Urinary frequency  -     Comprehensive metabolic panel; Future  -     CBC No Differential; Future    3. Confusion  -     Comprehensive metabolic panel; Future  -     CBC No Differential; " Future    4. Encounter for vaccination  -     Fluzone High-Dose 65+yrs ([id number])             Follow Up   Return in 19 days (on 11/15/2023), or if symptoms worsen or fail to improve, for Next scheduled follow up with PCP.  Patient was given instructions and counseling regarding her condition or for health maintenance advice. Please see specific information pulled into the AVS if appropriate.

## 2023-10-31 NOTE — PROGRESS NOTES
Blood sugar slightly elevated, not a fasting lab. Liver enzymes have increased significantly. This can be related to medications. Please ask who is managing medications and assuring she is taking the right doses, also, make sure patient does not have access to OTC medications in the home such as tylenol and ibuprofen. Would also recommend increase protein(such as ensure) and water intake. Keep up coming appointment with PCP to discuss rechecking liver enzymes and medications.

## 2023-11-15 ENCOUNTER — LAB (OUTPATIENT)
Dept: LAB | Facility: HOSPITAL | Age: 88
End: 2023-11-15
Payer: MEDICARE

## 2023-11-15 ENCOUNTER — OFFICE VISIT (OUTPATIENT)
Dept: FAMILY MEDICINE CLINIC | Age: 88
End: 2023-11-15
Payer: MEDICARE

## 2023-11-15 VITALS
BODY MASS INDEX: 17.04 KG/M2 | WEIGHT: 108.6 LBS | DIASTOLIC BLOOD PRESSURE: 73 MMHG | HEIGHT: 67 IN | HEART RATE: 61 BPM | SYSTOLIC BLOOD PRESSURE: 144 MMHG

## 2023-11-15 DIAGNOSIS — R74.01 ELEVATED ALT MEASUREMENT: ICD-10-CM

## 2023-11-15 DIAGNOSIS — R41.3 MEMORY LOSS OR IMPAIRMENT: Primary | ICD-10-CM

## 2023-11-15 LAB
ALBUMIN SERPL-MCNC: 3.8 G/DL (ref 3.5–5.2)
ALBUMIN/GLOB SERPL: 1.1 G/DL
ALP SERPL-CCNC: 102 U/L (ref 39–117)
ALT SERPL W P-5'-P-CCNC: 21 U/L (ref 1–33)
ANION GAP SERPL CALCULATED.3IONS-SCNC: 8.2 MMOL/L (ref 5–15)
AST SERPL-CCNC: 25 U/L (ref 1–32)
BILIRUB SERPL-MCNC: 0.4 MG/DL (ref 0–1.2)
BUN SERPL-MCNC: 22 MG/DL (ref 8–23)
BUN/CREAT SERPL: 22.9 (ref 7–25)
CALCIUM SPEC-SCNC: 9.8 MG/DL (ref 8.6–10.5)
CHLORIDE SERPL-SCNC: 101 MMOL/L (ref 98–107)
CO2 SERPL-SCNC: 28.8 MMOL/L (ref 22–29)
CREAT SERPL-MCNC: 0.96 MG/DL (ref 0.57–1)
EGFRCR SERPLBLD CKD-EPI 2021: 57.7 ML/MIN/1.73
GLOBULIN UR ELPH-MCNC: 3.6 GM/DL
GLUCOSE SERPL-MCNC: 115 MG/DL (ref 65–99)
HAV IGM SERPL QL IA: NORMAL
HBV CORE IGM SERPL QL IA: NORMAL
HBV SURFACE AG SERPL QL IA: NORMAL
HCV AB SER DONR QL: NORMAL
POTASSIUM SERPL-SCNC: 4.2 MMOL/L (ref 3.5–5.2)
PROT SERPL-MCNC: 7.4 G/DL (ref 6–8.5)
SODIUM SERPL-SCNC: 138 MMOL/L (ref 136–145)

## 2023-11-15 PROCEDURE — 80074 ACUTE HEPATITIS PANEL: CPT

## 2023-11-15 PROCEDURE — 1160F RVW MEDS BY RX/DR IN RCRD: CPT | Performed by: NURSE PRACTITIONER

## 2023-11-15 PROCEDURE — 1159F MED LIST DOCD IN RCRD: CPT | Performed by: NURSE PRACTITIONER

## 2023-11-15 PROCEDURE — 80053 COMPREHEN METABOLIC PANEL: CPT

## 2023-11-15 PROCEDURE — 36415 COLL VENOUS BLD VENIPUNCTURE: CPT

## 2023-11-15 PROCEDURE — 99213 OFFICE O/P EST LOW 20 MIN: CPT | Performed by: NURSE PRACTITIONER

## 2023-11-15 NOTE — PROGRESS NOTES
Karly Laguerre presents to [facility].    Chief Complaint:  Abnormal Lab (Follow up liver enzymes)         History of Present Illness:  Elevated LFT's  Associated symptoms:  Her son brought her in today.  She does not drink alcohol.  Her medications are given to her and she has no access to them or any OTC rx.'s.    Labs:  Lab Results       Component                Value               Date                       GLUCOSE                  105 (H)             10/27/2023                 BUN                      21                  10/27/2023                 CREATININE               0.99                10/27/2023                 EGFRIFNONA               38 (L)              10/08/2021                 BCR                      21.2                10/27/2023                 K                        4.0                 10/27/2023                 CO2                      26.1                10/27/2023                 CALCIUM                  8.8                 10/27/2023                 ALBUMIN                  3.2 (L)             10/27/2023                 LABIL2                   1.7                 2021                 AST                      441 (H)             10/27/2023                 ALT                      433 (H)             10/27/2023              PAST MEDICAL HISTORY changes since :       A fib  HLD  Dementia  Rectal cancer: had rectal resection / ileostomy 3-2021/then reversed     Lung cancer: dx'd in ; Stage 1 adenocarcinoma; VATS procedure / path benign process;     retinal detachment in / had surgery R         GYNECOLOGICAL HISTORY:             PREVENTIVE HEALTH MAINTENANCE             BONE DENSITY: was last done 19 with the following abnormality noted-- osteopenia                 Surgical History:       Flex sigmoidoscopy  2022 colonoscopy   Ostomy repair : loop iliostomy closure with small bowel resection and  "enterolysis     Biopsy of breast; benign    Cataract Removal: bilateral; 2016;     Cholecystectomy    Dilation and Curettage  Colonoscopy 3-2017    Hysterectomy: rectocele repair / BSO;         Positive for    VATS 10-13-;;         Family History:     Father:  at age 32; Cause of death was MVA     Mother:  at age 61; Cause of death was CLL     Brother(s): 2 brother(s) total;  Coronary Artery Disease; Hypertension     Sister(s): 8 sister(s) total; 2 ;  Hypertension;  Colon Cancer;  COPD         Social History:     Occupation: Retired (Prior occupation: RN)     Marital Status:      Children: 2 children           Review of Systems:  Review of Systems   Constitutional:  Positive for unexpected weight loss (down four pounds from a few weeks ago). Negative for fever.   Respiratory:  Negative for cough and shortness of breath.    Cardiovascular:  Positive for leg swelling. Negative for chest pain.   Gastrointestinal:  Positive for constipation (occ, taking fiber, helping).   Genitourinary:  Negative for difficulty urinating.          Current Outpatient Medications:     dilTIAZem (CARDIZEM) 30 MG tablet, Take 1 tablet by mouth 3 (Three) Times a Day., Disp: , Rfl:     memantine (NAMENDA) 5 MG tablet, Take 2 tablets by mouth 2 (Two) Times a Day., Disp: , Rfl:     metoprolol succinate XL (TOPROL-XL) 200 MG 24 hr tablet, Take 1 tablet by mouth Daily., Disp: , Rfl:     multivitamin with minerals tablet tablet, Take 1 tablet by mouth Daily., Disp: , Rfl:     rivaroxaban (XARELTO) 15 MG tablet, Take 1 tablet by mouth Daily., Disp: , Rfl:     traZODone (DESYREL) 50 MG tablet, Take 1 tablet by mouth Every Night., Disp: , Rfl:     Vital Signs:   Vitals:    11/15/23 1603   BP: 144/73   BP Location: Right arm   Patient Position: Sitting   Pulse: 61   Weight: 49.3 kg (108 lb 9.6 oz)   Height: 170.2 cm (67\")              Physical Exam:  Physical Exam  Vitals reviewed.   Constitutional:       General: She is not " in acute distress.     Appearance: Normal appearance.      Comments: Thin    Cardiovascular:      Rate and Rhythm: Normal rate and regular rhythm.      Heart sounds: Normal heart sounds. No murmur heard.  Pulmonary:      Effort: Pulmonary effort is normal. No respiratory distress.      Breath sounds: Normal breath sounds.   Abdominal:      General: Bowel sounds are normal.      Palpations: Abdomen is soft. There is no mass.      Tenderness: There is abdominal tenderness (mild to left mid quad).   Psychiatric:      Comments: Pleasant but quiet          Result Review      The following data was reviewed by: PAULIE Barajas on 11/15/2023:    Results for orders placed or performed in visit on 10/27/23   Comprehensive metabolic panel    Specimen: Blood   Result Value Ref Range    Glucose 105 (H) 65 - 99 mg/dL    BUN 21 8 - 23 mg/dL    Creatinine 0.99 0.57 - 1.00 mg/dL    Sodium 141 136 - 145 mmol/L    Potassium 4.0 3.5 - 5.2 mmol/L    Chloride 105 98 - 107 mmol/L    CO2 26.1 22.0 - 29.0 mmol/L    Calcium 8.8 8.6 - 10.5 mg/dL    Total Protein 6.5 6.0 - 8.5 g/dL    Albumin 3.2 (L) 3.5 - 5.2 g/dL    ALT (SGPT) 433 (H) 1 - 33 U/L    AST (SGOT) 441 (H) 1 - 32 U/L    Alkaline Phosphatase 262 (H) 39 - 117 U/L    Total Bilirubin 0.5 0.0 - 1.2 mg/dL    Globulin 3.3 gm/dL    A/G Ratio 1.0 g/dL    BUN/Creatinine Ratio 21.2 7.0 - 25.0    Anion Gap 9.9 5.0 - 15.0 mmol/L    eGFR 55.6 (L) >60.0 mL/min/1.73   CBC No Differential    Specimen: Blood   Result Value Ref Range    WBC 8.04 3.40 - 10.80 10*3/mm3    RBC 4.56 3.77 - 5.28 10*6/mm3    Hemoglobin 13.0 12.0 - 15.9 g/dL    Hematocrit 40.9 34.0 - 46.6 %    MCV 89.7 79.0 - 97.0 fL    MCH 28.5 26.6 - 33.0 pg    MCHC 31.8 31.5 - 35.7 g/dL    RDW 13.1 12.3 - 15.4 %    RDW-SD 43.8 37.0 - 54.0 fl    MPV 9.3 6.0 - 12.0 fL    Platelets 276 140 - 450 10*3/mm3               Assessment and Plan:          Diagnoses and all orders for this visit:    1. Memory loss or impairment  (Primary)  Assessment & Plan:  She continues to get care from her son and her sister's and is on her rx's, reviewed recent labs       2. Elevated ALT measurement  Assessment & Plan:  Rechecking labs after reviewing labs, and checking hepatitis panel, may check a liver ultrasound    Orders:  -     Hepatitis panel, acute; Future  -     Comprehensive metabolic panel; Future        BMI is below normal parameters (malnutrition). Recommendations: son is making sure she gets ensure/boost and drinking adequate fluids          Follow Up   Return for followup pending lab results.  Patient was given instructions and counseling regarding her condition or for health maintenance advice. Please see specific information pulled into the AVS if appropriate.

## 2024-01-11 ENCOUNTER — TELEPHONE (OUTPATIENT)
Dept: FAMILY MEDICINE CLINIC | Age: 89
End: 2024-01-11
Payer: MEDICARE

## 2024-01-11 NOTE — TELEPHONE ENCOUNTER
Caller: RADHA DENNISON    Relationship: Emergency Contact    Best call back number: [phone]    What is the best time to reach you: ANYTIME    Who are you requesting to speak with (clinical staff, provider,  specific staff member): NURSE    What was the call regarding: PATIENT'S SON IS CALLING STATING THAT THEY ARE TRYING TO GET HER INTO AN ASSISTED LIVING [facility] AND THEY NEED THE FOLLOW INFORMATION FAXED TO THEM.   HNP, FACE SHEET AND LIST OF HER MEDICATION.   PLEASE FAX TO ATTN:  MATEUS MONSON [phone]

## 2024-03-19 ENCOUNTER — TELEPHONE (OUTPATIENT)
Dept: FAMILY MEDICINE CLINIC | Age: 89
End: 2024-03-19
Payer: MEDICARE

## 2024-03-19 NOTE — TELEPHONE ENCOUNTER
Tomas Hale at [facility]. She said the son Domingo is going to admit his mother there, her  is already a resident there.  They are needing a recent office note.  Last office note we have is 11/15/23.  She said she really needs to be seen more recent.  She scheduled an appt for her for 03/20/24, she will talk to the son and if that time doesn't work out for him she will have him call and reschedule appt.  Fax note to [phone].

## 2024-03-20 ENCOUNTER — OFFICE VISIT (OUTPATIENT)
Dept: FAMILY MEDICINE CLINIC | Age: 89
End: 2024-03-20
Payer: MEDICARE

## 2024-03-20 VITALS
TEMPERATURE: 98 F | HEIGHT: 67 IN | SYSTOLIC BLOOD PRESSURE: 156 MMHG | DIASTOLIC BLOOD PRESSURE: 72 MMHG | WEIGHT: 111.6 LBS | BODY MASS INDEX: 17.52 KG/M2 | HEART RATE: 64 BPM

## 2024-03-20 DIAGNOSIS — I48.0 PAROXYSMAL ATRIAL FIBRILLATION: ICD-10-CM

## 2024-03-20 DIAGNOSIS — G47.09 OTHER INSOMNIA: ICD-10-CM

## 2024-03-20 DIAGNOSIS — Z85.048 HISTORY OF COLORECTAL CANCER: ICD-10-CM

## 2024-03-20 DIAGNOSIS — R41.3 MEMORY LOSS OR IMPAIRMENT: Primary | ICD-10-CM

## 2024-03-20 PROCEDURE — 1160F RVW MEDS BY RX/DR IN RCRD: CPT | Performed by: NURSE PRACTITIONER

## 2024-03-20 PROCEDURE — 1159F MED LIST DOCD IN RCRD: CPT | Performed by: NURSE PRACTITIONER

## 2024-03-20 PROCEDURE — 99214 OFFICE O/P EST MOD 30 MIN: CPT | Performed by: NURSE PRACTITIONER

## 2024-03-20 NOTE — TELEPHONE ENCOUNTER
Faxed todays note as well to Tova at [facility] and gave the family a copy of todays note and the note from November 2023.

## 2024-03-20 NOTE — PROGRESS NOTES
Chief Complaint  Memory Loss (Patient going into nursing home and needs a follow up )    Here with her today is her younger sister Nena.      Subjective          Karly Laguerre presents to [facility]    History of Present Illness  Memory loss  Plan is to be admitted to a nursing home, hopefully will be accepted tomorrow at [facility].   Her  in same facility.  On namenda  And takes Trazodone at HS to assist with her sleep.  Needs to be monitored: Stays with her son or one of her siblings at night.    Hypertension/Afib :  Meds are in a planner set up by her son, so today she has missed one dose of rx  Current medication: toprol and dilatizem  Tolerating Medication: Yes  Checking BP at home and it is: 140's/70's   Needs refills: No  Labs:  Lab Results       Component                Value               Date                       GLUCOSE                  115 (H)             11/15/2023                 BUN                      22                  11/15/2023                 CREATININE               0.96                11/15/2023                 EGFRIFNONA               38 (L)              10/08/2021                 BCR                      22.9                11/15/2023                 K                        4.2                 11/15/2023                 CO2                      28.8                11/15/2023                 CALCIUM                  9.8                 11/15/2023                 ALBUMIN                  3.8                 11/15/2023                 LABIL2                   1.7                 01/06/2021                 AST                      25                  11/15/2023                 ALT                      21                  11/15/2023                PAST MEDICAL HISTORY changes since 11-:       A fib  HLD  Dementia  Rectal cancer: had rectal resection / ileostomy 3-2021/then reversed     Lung cancer: dx'd in 9-2020;  Stage 1 adenocarcinoma; VATS procedure / path benign process;     retinal detachment in / had surgery R         GYNECOLOGICAL HISTORY:             PREVENTIVE HEALTH MAINTENANCE             BONE DENSITY: was last done 19 with the following abnormality noted-- osteopenia                 Surgical History:       Flex sigmoidoscopy  2022 colonoscopy   Ostomy repair : loop iliostomy closure with small bowel resection and enterolysis     Biopsy of breast; benign    Cataract Removal: bilateral; ;     Cholecystectomy    Dilation and Curettage  Colonoscopy 3-2017    Hysterectomy: rectocele repair / BSO;         Positive for    VATS 10-13-20;;         Family History:       Father:  at age 32; Cause of death was MVA     Mother:  at age 61; Cause of death was CLL     Brother(s): 2 brother(s) total;  Coronary Artery Disease; Hypertension     Sister(s): (Kecia tsai)  8 sister(s) total; 2 ;  Hypertension;  Colon Cancer;  COPD, lung    Sons: 2, 1 , accidental drug OD       Social History:       Occupation: Retired (Prior occupation: RN)     Marital Status:      Children: 2 children              Past Medical History:   Diagnosis Date    Abnormal weight loss     Chest pain, unspecified     Colon cancer 2021    invasive colonic adencarcinoma sigmoid colon tumor    Essential (primary) hypertension     Heart palpitations     Hypercholesterolemia     Joint pain     Microscopic hematuria     Mixed hyperlipidemia     Osteopenia     Other fatigue     Other fecal abnormalities     Other nonspecific abnormal finding of lung field     Other specified disorders of bone density and structure, unspecified site     Personal history of other diseases of circulatory system     Postmenopausal atrophic vaginitis     Radiculopathy, sacral and sacrococcygeal region     Retinal detachment     had surgery    Sacral pain     Unspecified atrial fibrillation     Urinary,  incontinence, stress female     UTI (urinary tract infection)     Varicose vein of leg        Allergies   Allergen Reactions    Penicillins Rash    Ace Inhibitors Swelling    Sulfamethoxazole-Trimethoprim Rash        Past Surgical History:   Procedure Laterality Date    BILATERAL VATS ABLATION  10/13/2020    benign    BREAST BIOPSY      benign    CATARACT EXTRACTION, BILATERAL  2016    CHOLECYSTECTOMY      COLON RESECTION WITH ILEOSTOMY      rectal cancer: robotic surgery. flex sig    COLONOSCOPY      DILATION AND CURETTAGE, DIAGNOSTIC / THERAPEUTIC      HYSTERECTOMY      rectocele repair/bso    SIGMOIDOSCOPY  2021    rectal polyp malignant        Social History     Tobacco Use    Smoking status: Former     Current packs/day: 0.00     Average packs/day: 0.5 packs/day for 25.0 years (12.5 ttl pk-yrs)     Types: Cigarettes     Start date:      Quit date:      Years since quittin.2    Smokeless tobacco: Never   Substance Use Topics    Alcohol use: Yes     Comment: a few days a week       Family History   Problem Relation Age of Onset    Leukemia Mother         chronic lymphocytic leukemia    Hypertension Sister     COPD Sister     Colon cancer Sister     Coronary artery disease Brother     Hypertension Brother     Coronary artery disease Brother     Hypertension Brother     Hypertension Sister     COPD Sister     Colon cancer Sister         Health Maintenance Due   Topic Date Due    TDAP/TD VACCINES (1 - Tdap) Never done    ZOSTER VACCINE (2 of 3) 2020    ANNUAL WELLNESS VISIT  Never done    DXA SCAN  2021    LIPID PANEL  2022    COVID-19 Vaccine (2023- season) 2023        Current Outpatient Medications on File Prior to Visit   Medication Sig    dilTIAZem (CARDIZEM) 30 MG tablet Take 1 tablet by mouth 3 (Three) Times a Day.    memantine (NAMENDA) 5 MG tablet Take 2 tablets by mouth 2 (Two) Times a Day.    metoprolol succinate XL (TOPROL-XL) 200 MG 24 hr tablet Take 1  "tablet by mouth Daily.    multivitamin with minerals tablet tablet Take 1 tablet by mouth Daily.    rivaroxaban (XARELTO) 15 MG tablet Take 1 tablet by mouth Daily.    traZODone (DESYREL) 50 MG tablet Take 1 tablet by mouth Every Night.     No current facility-administered medications on file prior to visit.       Immunization History   Administered Date(s) Administered    COVID-19 (MODERNA) BIVALENT 12+YRS 11/23/2022    COVID-19 (PFIZER) Purple Cap Monovalent 03/10/2021, 05/16/2021, 10/30/2021    Fluad Quad 65+ 11/23/2022    Fluzone High Dose =>65 Years ([employer] ONLY) 10/16/2013, 09/23/2017, 10/07/2019, 10/01/2021    Fluzone High-Dose 65+yrs 10/27/2023    Influenza, Unspecified 10/01/2017, 09/22/2020    Pneumococcal Conjugate 13-Valent (PCV13) 01/17/2017    Pneumococcal Polysaccharide (PPSV23) 11/01/2003    Zostavax 02/24/2010    Zoster, Unspecified 11/25/2019       Review of Systems   Constitutional:  Negative for fatigue and fever.   Respiratory:  Negative for cough and shortness of breath.    Cardiovascular:  Negative for chest pain, palpitations and leg swelling.   Gastrointestinal:  Negative for constipation.        Objective     Vitals:    03/20/24 1412   BP: 156/72   BP Location: Right arm   Patient Position: Sitting   Cuff Size: Adult   Pulse: 64   Temp: 98 °F (36.7 °C)   TempSrc: Oral   Weight: 50.6 kg (111 lb 9.6 oz)   Height: 170.2 cm (67\")            Physical Exam  Vitals reviewed.   Constitutional:       General: She is not in acute distress.     Appearance: Normal appearance.      Comments: Thin   Neck:      Vascular: No carotid bruit.   Cardiovascular:      Rate and Rhythm: Normal rate and regular rhythm.      Heart sounds: Normal heart sounds. No murmur heard.  Pulmonary:      Effort: Pulmonary effort is normal. No respiratory distress.      Breath sounds: Normal breath sounds.   Abdominal:      Palpations: Abdomen is soft.      Tenderness: There is no abdominal tenderness.   Musculoskeletal:      " Right lower leg: No edema.      Left lower leg: No edema.   Neurological:      Mental Status: She is alert.   Psychiatric:         Mood and Affect: Affect is flat.      Comments: Quiet          Result Review :     The following data was reviewed by: PAULIE Barajas on 03/20/2024:                       Assessment and Plan      Diagnoses and all orders for this visit:    1. Memory loss or impairment (Primary)  Assessment & Plan:  She continues her rx with the assistance of caregivers.       2. Paroxysmal atrial fibrillation  Assessment & Plan:  She sees cardiology and is on rx, continues rx and Bp is monitored at home; reviewed last labs in        3. History of colorectal cancer  Assessment & Plan:  Reviewed, past history of colorectal cacner       4. Other insomnia  Assessment & Plan:  Continues rx for sleep                      Follow Up     Return for follow up will be pending her admission to the [facility]. .    Patient was given instructions and counseling regarding her condition or for health maintenance advice. Please see specific information pulled into the AVS if appropriate.

## 2024-03-20 NOTE — ASSESSMENT & PLAN NOTE
She sees cardiology and is on rx, continues rx and Bp is monitored at home; reviewed last labs in